# Patient Record
Sex: MALE | Race: WHITE | NOT HISPANIC OR LATINO | Employment: FULL TIME | URBAN - METROPOLITAN AREA
[De-identification: names, ages, dates, MRNs, and addresses within clinical notes are randomized per-mention and may not be internally consistent; named-entity substitution may affect disease eponyms.]

---

## 2017-03-01 ENCOUNTER — OFFICE VISIT (OUTPATIENT)
Dept: URGENT CARE | Facility: CLINIC | Age: 32
End: 2017-03-01
Payer: COMMERCIAL

## 2017-03-01 VITALS
OXYGEN SATURATION: 98 % | DIASTOLIC BLOOD PRESSURE: 86 MMHG | SYSTOLIC BLOOD PRESSURE: 140 MMHG | RESPIRATION RATE: 16 BRPM | TEMPERATURE: 98.4 F | HEART RATE: 76 BPM

## 2017-03-01 DIAGNOSIS — J03.90 TONSILLITIS: ICD-10-CM

## 2017-03-01 LAB
INT CON NEG: NEGATIVE
INT CON POS: POSITIVE
S PYO AG THROAT QL: NEGATIVE

## 2017-03-01 PROCEDURE — 99214 OFFICE O/P EST MOD 30 MIN: CPT | Performed by: PHYSICIAN ASSISTANT

## 2017-03-01 PROCEDURE — 87880 STREP A ASSAY W/OPTIC: CPT | Performed by: PHYSICIAN ASSISTANT

## 2017-03-01 RX ORDER — CEFUROXIME AXETIL 500 MG/1
500 TABLET ORAL 2 TIMES DAILY
Qty: 14 TAB | Refills: 0 | Status: SHIPPED | OUTPATIENT
Start: 2017-03-01 | End: 2017-03-08

## 2017-03-01 RX ORDER — MELOXICAM 15 MG/1
15 TABLET ORAL DAILY
Qty: 10 TAB | Refills: 0 | Status: SHIPPED | OUTPATIENT
Start: 2017-03-01 | End: 2017-04-15

## 2017-03-01 ASSESSMENT — ENCOUNTER SYMPTOMS
RESPIRATORY NEGATIVE: 1
SWOLLEN GLANDS: 1
COUGH: 0
TROUBLE SWALLOWING: 1
CONSTITUTIONAL NEGATIVE: 1
EYES NEGATIVE: 1
SORE THROAT: 1

## 2017-03-01 NOTE — MR AVS SNAPSHOT
Benny Barnard Kentrelldaciacindi DE GUZMAN   3/1/2017 6:10 PM   Office Visit   MRN: [id number]    Department:  [facility]   Dept Phone:  [phone]    Description:  Male : 1985   Provider:  Darci Shanks PA-C           Reason for Visit     Pharyngitis x 2 days, sore throat, hard to talk, pain to swallow      Allergies as of 3/1/2017     No Known Allergies      You were diagnosed with     Tonsillitis   [[id number]]         Vital Signs     Blood Pressure Pulse Temperature Respirations Oxygen Saturation Smoking Status    140/86 mmHg 76 36.9 °C (98.4 °F) 16 98% Never Smoker       Basic Information     Date Of Birth Sex Race Ethnicity Preferred Language    1985 Male White Non- English      Health Maintenance        Date Due Completion Dates    IMM DTaP/Tdap/Td Vaccine (1 - Tdap) 2006 ---    IMM INFLUENZA (1) 2016 ---            Current Immunizations     No immunizations on file.      Below and/or attached are the medications your provider expects you to take. Review all of your home medications and newly ordered medications with your provider and/or pharmacist. Follow medication instructions as directed by your provider and/or pharmacist. Please keep your medication list with you and share with your provider. Update the information when medications are discontinued, doses are changed, or new medications (including over-the-counter products) are added; and carry medication information at all times in the event of emergency situations     Allergies:  No Known Allergies          Medications  Valid as of: 2017 -  8:06 PM    Generic Name Brand Name Tablet Size Instructions for use    Cefuroxime Axetil (Tab) CEFTIN 500 MG Take 1 Tab by mouth 2 times a day for 7 days.        Ibuprofen (Tab) MOTRIN 800 MG Take 1 Tab by mouth every 8 hours as needed.        Meclizine HCl (Tab) ANTIVERT 25 MG 0.5 TO 1 TAB EVERY 6 HOURS ONLY IF NEEDED FOR DIZZINESS, NAUSEA, OR VOMITING. MAY CAUSE DROWSINESS.       Meloxicam (Tab) MOBIC 15 MG Take 1 Tab by mouth every day. as needed for pain        .                 Medicines prescribed today were sent to:     [facility] [place], [place] - [address]    [address] [place] [address]    Phone: [phone] Fax: [phone]    Open 24 Hours?: No      Medication refill instructions:       If your prescription bottle indicates you have medication refills left, it is not necessary to call your provider’s office. Please contact your pharmacy and they will refill your medication.    If your prescription bottle indicates you do not have any refills left, you may request refills at any time through one of the following ways: The online Visto system (except Urgent Care), by calling your provider’s office, or by asking your pharmacy to contact your provider’s office with a refill request. Medication refills are processed only during regular business hours and may not be available until the next business day. Your provider may request additional information or to have a follow-up visit with you prior to refilling your medication.   *Please Note: Medication refills are assigned a new Rx number when refilled electronically. Your pharmacy may indicate that no refills were authorized even though a new prescription for the same medication is available at the pharmacy. Please request the medicine by name with the pharmacy before contacting your provider for a refill.           Visto Access Code: [email or web address]  Expires: 3/31/2017  8:06 PM    Visto  A secure, online tool to manage your health information     [employer] Visto® is a secure, online tool that connects you to your personalized health information from the privacy of your home -- day or night - making it very easy for you to manage your healthcare. Once the activation process is completed, you can even access your medical information using the Visto юлия, which is available for free in the Apple Юлия store  or Google Play store.     [employer] provides the following levels of access (as shown below):   My Chart Features   [facility] Doctor [facility]  Specialists [facility]  Urgent  Care Non-[facility]  Primary Care  Doctor   Email your healthcare team securely and privately 24/7 X X X    Manage appointments: schedule your next appointment; view details of past/upcoming appointments X      Request prescription refills. X      View recent personal medical records, including lab and immunizations X X X X   View health record, including health history, allergies, medications X X X X   Read reports about your outpatient visits, procedures, consult and ER notes X X X X   See your discharge summary, which is a recap of your hospital and/or ER visit that includes your diagnosis, lab results, and care plan. X X       How to register for [employer]:  1. Go to  [email or web address].  2. Click on the Sign Up Now box, which takes you to the New Member Sign Up page. You will need to provide the following information:  a. Enter your [employer] Access Code exactly as it appears at the top of this page. (You will not need to use this code after you’ve completed the sign-up process. If you do not sign up before the expiration date, you must request a new code.)   b. Enter your date of birth.   c. Enter your home email address.   d. Click Submit, and follow the next screen’s instructions.  3. Create a [employer] ID. This will be your [employer] login ID and cannot be changed, so think of one that is secure and easy to remember.  4. Create a [employer] password. You can change your password at any time.  5. Enter your Password Reset Question and Answer. This can be used at a later time if you forget your password.   6. Enter your e-mail address. This allows you to receive e-mail notifications when new information is available in [employer].  7. Click Sign Up. You can now view your health information.    For assistance activating your [employer] account, call  [phone]

## 2017-03-01 NOTE — Clinical Note
March 1, 2017         Patient: Benny Rodriguez II   YOB: 1985   Date of Visit: 3/1/2017           To Whom it May Concern:    Benny Rodriguez was seen in my clinic on 3/1/2017 for illness; please excuse Thursday.    If you have any questions or concerns, please don't hesitate to call.        Sincerely,           Darci Shanks PA-C  Electronically Signed

## 2017-04-15 PROCEDURE — 99284 EMERGENCY DEPT VISIT MOD MDM: CPT

## 2017-04-15 PROCEDURE — 303977 HCHG I & D

## 2017-04-15 ASSESSMENT — PAIN SCALES - GENERAL: PAINLEVEL_OUTOF10: 5

## 2017-04-16 ENCOUNTER — HOSPITAL ENCOUNTER (EMERGENCY)
Facility: MEDICAL CENTER | Age: 32
End: 2017-04-16
Attending: EMERGENCY MEDICINE
Payer: COMMERCIAL

## 2017-04-16 VITALS
SYSTOLIC BLOOD PRESSURE: 132 MMHG | TEMPERATURE: 98.1 F | OXYGEN SATURATION: 99 % | HEIGHT: 72 IN | BODY MASS INDEX: 27.74 KG/M2 | WEIGHT: 204.81 LBS | HEART RATE: 70 BPM | DIASTOLIC BLOOD PRESSURE: 78 MMHG | RESPIRATION RATE: 16 BRPM

## 2017-04-16 VITALS
RESPIRATION RATE: 15 BRPM | HEART RATE: 67 BPM | WEIGHT: 202.6 LBS | SYSTOLIC BLOOD PRESSURE: 119 MMHG | DIASTOLIC BLOOD PRESSURE: 85 MMHG | TEMPERATURE: 98 F | OXYGEN SATURATION: 97 % | BODY MASS INDEX: 27.47 KG/M2

## 2017-04-16 DIAGNOSIS — L03.90 CELLULITIS, UNSPECIFIED CELLULITIS SITE: ICD-10-CM

## 2017-04-16 DIAGNOSIS — L03.116 CELLULITIS OF LEFT LOWER EXTREMITY: ICD-10-CM

## 2017-04-16 DIAGNOSIS — T78.40XA ALLERGIC REACTION, INITIAL ENCOUNTER: ICD-10-CM

## 2017-04-16 DIAGNOSIS — L50.9 URTICARIA: ICD-10-CM

## 2017-04-16 PROCEDURE — 96372 THER/PROPH/DIAG INJ SC/IM: CPT

## 2017-04-16 PROCEDURE — 10060 I&D ABSCESS SIMPLE/SINGLE: CPT

## 2017-04-16 PROCEDURE — 96375 TX/PRO/DX INJ NEW DRUG ADDON: CPT

## 2017-04-16 PROCEDURE — 700111 HCHG RX REV CODE 636 W/ 250 OVERRIDE (IP): Performed by: EMERGENCY MEDICINE

## 2017-04-16 PROCEDURE — 700111 HCHG RX REV CODE 636 W/ 250 OVERRIDE (IP)

## 2017-04-16 PROCEDURE — 99284 EMERGENCY DEPT VISIT MOD MDM: CPT

## 2017-04-16 PROCEDURE — 700102 HCHG RX REV CODE 250 W/ 637 OVERRIDE(OP): Performed by: EMERGENCY MEDICINE

## 2017-04-16 PROCEDURE — A9270 NON-COVERED ITEM OR SERVICE: HCPCS | Performed by: EMERGENCY MEDICINE

## 2017-04-16 PROCEDURE — 700101 HCHG RX REV CODE 250: Performed by: EMERGENCY MEDICINE

## 2017-04-16 PROCEDURE — 96374 THER/PROPH/DIAG INJ IV PUSH: CPT

## 2017-04-16 RX ORDER — CEPHALEXIN 500 MG/1
500 CAPSULE ORAL 3 TIMES DAILY
Qty: 21 CAP | Refills: 0 | Status: SHIPPED | OUTPATIENT
Start: 2017-04-16 | End: 2017-04-23

## 2017-04-16 RX ORDER — DIPHENHYDRAMINE HCL 25 MG
25 TABLET ORAL 3 TIMES DAILY
Qty: 15 TAB | Refills: 2 | Status: SHIPPED | OUTPATIENT
Start: 2017-04-16 | End: 2017-05-18

## 2017-04-16 RX ORDER — EPINEPHRINE 1 MG/ML(1)
0.3 AMPUL (ML) INJECTION ONCE
Status: COMPLETED | OUTPATIENT
Start: 2017-04-16 | End: 2017-04-16

## 2017-04-16 RX ORDER — LIDOCAINE HYDROCHLORIDE AND EPINEPHRINE 10; 10 MG/ML; UG/ML
10 INJECTION, SOLUTION INFILTRATION; PERINEURAL ONCE
Status: COMPLETED | OUTPATIENT
Start: 2017-04-16 | End: 2017-04-16

## 2017-04-16 RX ORDER — DIPHENHYDRAMINE HYDROCHLORIDE 50 MG/ML
50 INJECTION INTRAMUSCULAR; INTRAVENOUS ONCE
Status: COMPLETED | OUTPATIENT
Start: 2017-04-16 | End: 2017-04-16

## 2017-04-16 RX ORDER — METHYLPREDNISOLONE SODIUM SUCCINATE 125 MG/2ML
INJECTION, POWDER, LYOPHILIZED, FOR SOLUTION INTRAMUSCULAR; INTRAVENOUS
Status: COMPLETED
Start: 2017-04-16 | End: 2017-04-16

## 2017-04-16 RX ORDER — CLINDAMYCIN HYDROCHLORIDE 300 MG/1
300 CAPSULE ORAL 4 TIMES DAILY
Qty: 20 CAP | Refills: 0 | Status: SHIPPED | OUTPATIENT
Start: 2017-04-16 | End: 2017-04-21

## 2017-04-16 RX ORDER — PREDNISONE 20 MG/1
60 TABLET ORAL ONCE
Status: COMPLETED | OUTPATIENT
Start: 2017-04-16 | End: 2017-04-16

## 2017-04-16 RX ORDER — METHYLPREDNISOLONE SODIUM SUCCINATE 125 MG/2ML
125 INJECTION, POWDER, LYOPHILIZED, FOR SOLUTION INTRAMUSCULAR; INTRAVENOUS ONCE
Status: COMPLETED | OUTPATIENT
Start: 2017-04-16 | End: 2017-04-16

## 2017-04-16 RX ORDER — SULFAMETHOXAZOLE AND TRIMETHOPRIM 800; 160 MG/1; MG/1
1 TABLET ORAL ONCE
Status: COMPLETED | OUTPATIENT
Start: 2017-04-16 | End: 2017-04-16

## 2017-04-16 RX ORDER — CEPHALEXIN 500 MG/1
500 CAPSULE ORAL ONCE
Status: COMPLETED | OUTPATIENT
Start: 2017-04-16 | End: 2017-04-16

## 2017-04-16 RX ORDER — EPINEPHRINE 1 MG/ML(1)
AMPUL (ML) INJECTION
Status: COMPLETED
Start: 2017-04-16 | End: 2017-04-16

## 2017-04-16 RX ORDER — SULFAMETHOXAZOLE AND TRIMETHOPRIM 800; 160 MG/1; MG/1
1 TABLET ORAL 2 TIMES DAILY
Qty: 14 TAB | Refills: 0 | Status: SHIPPED | OUTPATIENT
Start: 2017-04-16 | End: 2017-04-23

## 2017-04-16 RX ADMIN — PREDNISONE 60 MG: 20 TABLET ORAL at 04:28

## 2017-04-16 RX ADMIN — CEPHALEXIN 500 MG: 500 CAPSULE ORAL at 02:49

## 2017-04-16 RX ADMIN — METHYLPREDNISOLONE SODIUM SUCCINATE 125 MG: 125 INJECTION, POWDER, LYOPHILIZED, FOR SOLUTION INTRAMUSCULAR; INTRAVENOUS at 04:30

## 2017-04-16 RX ADMIN — DIPHENHYDRAMINE HYDROCHLORIDE 50 MG: 50 INJECTION, SOLUTION INTRAMUSCULAR; INTRAVENOUS at 04:29

## 2017-04-16 RX ADMIN — LIDOCAINE HYDROCHLORIDE,EPINEPHRINE BITARTRATE 10 ML: 10; .01 INJECTION, SOLUTION INFILTRATION; PERINEURAL at 02:45

## 2017-04-16 RX ADMIN — Medication 0.3 MG: at 04:30

## 2017-04-16 RX ADMIN — METHYLPREDNISOLONE SODIUM SUCCINATE 125 MG: 125 INJECTION, POWDER, FOR SOLUTION INTRAMUSCULAR; INTRAVENOUS at 04:30

## 2017-04-16 RX ADMIN — SULFAMETHOXAZOLE AND TRIMETHOPRIM 1 TABLET: 800; 160 TABLET ORAL at 02:49

## 2017-04-16 RX ADMIN — FAMOTIDINE 20 MG: 10 INJECTION, SOLUTION INTRAVENOUS at 04:30

## 2017-04-16 RX ADMIN — EPINEPHRINE 0.3 MG: 1 INJECTION, SOLUTION INTRAMUSCULAR; SUBCUTANEOUS at 04:30

## 2017-04-16 NOTE — ED PROVIDER NOTES
ED Provider Note    CHIEF COMPLAINT  Chief Complaint   Patient presents with   • Allergic Reaction     since aprox 0330. +hives, +facial swelling, +throat swelling       HPI  Benny Rodriguez II is a 31 y.o. male who presents with history of coming to the emergency room last night. Treated for a cellulitis on his knee with Septra, and Keflex. Shortly after he took the antibiotics, about 30 minutes he noted a rash on his face and trunk, slightly on the extremities, also itching. No fever no chills no nausea no vomiting no similar episodes. No shortness of breath or difficulty breathing    REVIEW OF SYSTEMS  See HPI for further details.Denies other G.I., G.U.. endrocine, cardiovascular, respriatory or neurological problems.  All other systems are negative.     PAST MEDICAL HISTORY  History reviewed. No pertinent past medical history.    FAMILY HISTORY  History reviewed. No pertinent family history.    SOCIAL HISTORY  Social History     Social History   • Marital Status:      Spouse Name: N/A   • Number of Children: N/A   • Years of Education: N/A     Social History Main Topics   • Smoking status: Never Smoker    • Smokeless tobacco: None   • Alcohol Use: No   • Drug Use: No   • Sexual Activity: Not Asked     Other Topics Concern   • None     Social History Narrative       SURGICAL HISTORY  Past Surgical History   Procedure Laterality Date   • Appendectomy         CURRENT MEDICATIONS  Home Medications     Reviewed by Mica Meng R.N. (Registered Nurse) on 04/16/17 at 0409  Med List Status: Complete    Medication Last Dose Status    cephALEXin (KEFLEX) 500 MG Cap  Active    ibuprofen (MOTRIN) 800 MG Tab 4/15/2017 Active    sulfamethoxazole-trimethoprim (BACTRIM DS) 800-160 MG tablet  Active                ALLERGIES  No Known Allergies    PHYSICAL EXAM  VITAL SIGNS: /85 mmHg  Pulse 86  Temp(Src) 36.7 °C (98 °F)  Resp 20  Wt 91.9 kg (202 lb 9.6 oz)  SpO2 99%  Constitutional: Well developed,  Well nourished, No acute distress, Non-toxic appearance.   HENT: Normocephalic, Atraumatic, Bilateral external ears normal, Oropharynx moist, No oral exudates, Nose normal.   Eyes: PERRL, EOMI, Conjunctiva normal, No discharge.   Neck: Normal range of motion, No tenderness, Supple, No stridor.   Lymphatic: No lymphadenopathy noted.   Cardiovascular: Normal heart rate, Normal rhythm, No murmurs, No rubs, No gallops.   Thorax & Lungs: Normal breath sounds, No respiratory distress, No wheezing, No chest tenderness.   Abdomen:  No tenderness, no guarding no rigidity and the abdomen is soft.  No masses, No pulsatile masses.  Skin: Warm, Dry, there is an urticarial rash, trunk and face proximal extremities  Back: No tenderness, No CVA tenderness.   Extremities: Intact distal pulses, redness dorsum of left knee, good range of motion of left knee, No cyanosis, No clubbing.   Musculoskeletal: Good range of motion in all major joints. No tenderness to palpation or major deformities noted.   Neurologic: Alert & oriented x 3, Normal motor function, Normal sensory function, No focal deficits noted.   Psychiatric: Affect normal, Judgment normal, Mood normal.       RADIOLOGY/PROCEDURES      COURSE & MEDICAL DECISION MAKING  Pertinent Labs & Imaging studies reviewed. (See chart for details) and appears to have allergic reaction, is given an IM epinephrine, Pepcid and Benadryl and prednisone. He was    He was here earlier in the evening for a cellulitis to his left knee, treated with Bactrim, Keflex, and immediately developed a rash after the antibiotics. I think he probably most likely has allergic reaction, urticaria treated as above    Review is responded well to epinephrine and a drill Pepcid and prednisone. Rashes completely disappeared no more itching. It does appear however he probably has an allergy to either Septra or Keflex,. So these medications will be discontinued. I will place him on Cleocin for his infection.  FINAL  IMPRESSION  1.   1. Urticaria    2. Allergic reaction, initial encounter        2. Cellulitis  3.     Disposition  Discharge instructions are understood. This patient is to return if fever vomiting or no better in 12 hours. Follow up with the [facility] clinic or private physician. Information sheets on allergic reaction urticaria and cellulitis  Electronically signed by: Huber Sorto, 4/16/2017 4:36 AM

## 2017-04-16 NOTE — ED AVS SNAPSHOT
4/16/2017    Benny Rodriguez   [address]  [place] [address]    Dear Benny:    [facility] wants to ensure your discharge home is safe and you or your loved ones have had all of your questions answered regarding your care after you leave the hospital.    Below is a list of resources and contact information should you have any questions regarding your hospital stay, follow-up instructions, or active medical symptoms.    Questions or Concerns Regarding… Contact   Medical Questions Related to Your Discharge  (7 days a week, 8am-5pm) Contact a Nurse Care Coordinator   [phone]   Medical Questions Not Related to Your Discharge  (24 hours a day / 7 days a week)  Contact the Nurse Health Line   [phone]    Medications or Discharge Instructions Refer to your discharge packet   or contact your [facility] Primary Care Provider   [phone]   Follow-up Appointment(s) Schedule your appointment via [employer]   or contact Scheduling [phone]   Billing Review your statement via [employer]  or contact Billing [phone]   Medical Records Review your records via [employer]   or contact Medical Records [phone]     You may receive a telephone call within two days of discharge. This call is to make certain you understand your discharge instructions and have the opportunity to have any questions answered. You can also easily access your medical information, test results and upcoming appointments via the [employer] free online health management tool. You can learn more and sign up at [employer]/Sonos. For assistance setting up your Sonos account, please call [phone].    Once again, we want to ensure your discharge home is safe and that you have a clear understanding of any next steps in your care. If you have any questions or concerns, please do not hesitate to contact us, we are here for you. Thank you for choosing [facility] for your healthcare needs.    Sincerely,    Your [facility] Healthcare Team

## 2017-04-16 NOTE — ED AVS SNAPSHOT
[employer] Access Code: [email or web address]  Expires: 5/16/2017  3:26 AM    Your email address is not on file at [facility].  Email Addresses are required for you to sign up for [employer], please contact [phone] to verify your personal information and to provide your email address prior to attempting to register for [employer].    Benny Rodriguez II  [address]  [place] [address]    [employer]  A secure, online tool to manage your health information     [employer]® is a secure, online tool that connects you to your personalized health information from the privacy of your home -- day or night - making it very easy for you to manage your healthcare. Once the activation process is completed, you can even access your medical information using the [employer] юлия, which is available for free in the Apple Юлия store or Google Play store.     To learn more about [employer], visit [email or web address]    There are two levels of access available (as shown below):   My Chart Features  [facility] Primary Care Doctor [facility]  Specialists [facility]  Urgent  Care Non-[facility] Primary Care Doctor   Email your healthcare team securely and privately 24/7 X X X    Manage appointments: schedule your next appointment; view details of past/upcoming appointments X      Request prescription refills. X      View recent personal medical records, including lab and immunizations X X X X   View health record, including health history, allergies, medications X X X X   Read reports about your outpatient visits, procedures, consult and ER notes X X X X   See your discharge summary, which is a recap of your hospital and/or ER visit that includes your diagnosis, lab results, and care plan X X  X     How to register for [employer]:  Once your e-mail address has been verified, follow the following steps to sign up for [employer].     1. Go to  [email or web address]  2. Click on the Sign Up Now box, which takes you to the New Member  Sign Up page. You will need to provide the following information:  a. Enter your [employer] Access Code exactly as it appears at the top of this page. (You will not need to use this code after you’ve completed the sign-up process. If you do not sign up before the expiration date, you must request a new code.)   b. Enter your date of birth.   c. Enter your home email address.   d. Click Submit, and follow the next screen’s instructions.  3. Create a [employer] ID. This will be your [employer] login ID and cannot be changed, so think of one that is secure and easy to remember.  4. Create a Exec password. You can change your password at any time.  5. Enter your Password Reset Question and Answer. This can be used at a later time if you forget your password.   6. Enter your e-mail address. This allows you to receive e-mail notifications when new information is available in [employer].  7. Click Sign Up. You can now view your health information.    For assistance activating your [employer] account, call [phone]

## 2017-04-16 NOTE — ED PROVIDER NOTES
ED Provider Note    Scribed for Aleksandra Guevara M.D. by Estelle Torres. 4/16/2017, 2:35 AM.    Primary care provider: Pcp Not In Computer  Means of arrival: Walk-In  History obtained from: Patient  History limited by: None    CHIEF COMPLAINT  Chief Complaint   Patient presents with   • Abscess     Left knee. Radius has increased since this AM.      HPI  Benny Rodriguez II is a 31 y.o. male who presents to the Emergency Department with an abscess to the left knee.  He can not recall incurring an insect bite or trauma.  The patient first noticed this abscess approximately eighteen hours prior to my examination.  Since then the abscess has slightly increased in size.  The patient notes that this abscess is erythematous and warm to the touch.  He has not developed associated fevers or chills.  The patient denies additional symptoms or further pertinent medical history.     REVIEW OF SYSTEMS  Pertinent positives include abscess with associated warmth. Pertinent negatives include no fevers, chills.     PAST MEDICAL HISTORY   The patient denies pertinent past medical history.     SURGICAL HISTORY   has past surgical history that includes appendectomy.    SOCIAL HISTORY  Social History   Substance Use Topics   • Smoking status: Never Smoker    • Smokeless tobacco: None   • Alcohol Use: No      History   Drug Use No     FAMILY HISTORY  History reviewed. No pertinent family history.    CURRENT MEDICATIONS  Home Medications     Reviewed by Mica Meng R.N. (Registered Nurse) on 04/15/17 at 2146  Med List Status: Complete    Medication Last Dose Status    ibuprofen (MOTRIN) 800 MG Tab 4/15/2017 Active              ALLERGIES  No Known Allergies    PHYSICAL EXAM  VITAL SIGNS: /78 mmHg  Pulse 71  Temp(Src) 36.8 °C (98.2 °F) (Temporal)  Resp 16  Ht 1.829 m (6')  Wt 92.9 kg (204 lb 12.9 oz)  BMI 27.77 kg/m2  SpO2 99%  Constitutional: Alert in no apparent distress.  HENT: No signs of trauma, Bilateral  external ears normal, Nose normal.   Cardiovascular: Regular rate and rhythm, no murmurs.   Thorax & Lungs: non labored respirations  Skin: Warm, Dry, No erythema, No rash. Left knee with a less than 0.5 cm lesion with dark discoloration surrounded by erythema extending about 7-8 cm. No active drainage   Musculoskeletal:  No major deformities noted.   Neurologic: Alert, moving all extremities without difficulty, no focal deficits.    Incision and Drainage Procedure Note    Indication: Abscess    Procedure: The patient was positioned appropriately and the skin over the incision site was prepped with betadine. Local anesthesia was obtained by infiltration using 1% Lidocaine with epinephrine.  An incision was then made over the apex of the lesion and no material was expressed. Loculations were not present.    The patient tolerated the procedure well.    Complications: None    COURSE & MEDICAL DECISION MAKING  Pertinent Labs & Imaging studies reviewed. (See chart for details)    2:35 AM - Patient seen and examined at bedside. Patient presents with an abscess to the left knee. He was informed that I will perform an incision and drainage procedure. Patient understood and agreed. He will be treated with 500 mg of Keflex via tablet and 800 mg of Bactrim via tablet.     2:47 PM - Re-examined; At this time I performed the incision and drainage procedure. The patient tolerated this well and further details can be seen above. Following the procedure I discussed his above findings and plans for discharge with prescriptions for Bactrim and Keflex. The patient will follow up with his primary care physician as needed. He was instructed to return to the ED if his symptoms worsen. The patient will receive further information on abscesses to take home.  All questions and concerns were addressed.  Patient understands and agrees.       The patient will return for new or worsening symptoms and is stable at the time of discharge. Patient  was given return precautions. Patient and/or family member verbalizes understanding and will comply.    DISPOSITION:  Patient will be discharged home in stable condition.    FOLLOW UP:  [facility], Emergency Dept  [address]  [place] [address] [phone]    Return for increasing redness, swelling, fever or other concerns      OUTPATIENT MEDICATIONS:  New Prescriptions    CEPHALEXIN (KEFLEX) 500 MG CAP    Take 1 Cap by mouth 3 times a day for 7 days.    SULFAMETHOXAZOLE-TRIMETHOPRIM (BACTRIM DS) 800-160 MG TABLET    Take 1 Tab by mouth 2 times a day for 7 days.     FINAL IMPRESSION  1. Cellulitis of left lower extremity       Incision and Drainage Procedure Completed    Your blood pressure is elevated here in the emergency department. Please monitor your blood pressure over the next several days. If your blood pressure continues to be 120/80 or higher please contact your physician for blood pressure management.     This dictation has been created using voice recognition software and/or scribes. The accuracy of the dictation is limited by the abilities of the software and the expertise of the scribes. I expect there may be some errors of grammar and possibly content. I made every attempt to manually correct the errors within my dictation. However, errors related to voice recognition software and/or scribes may still exist and should be interpreted within the appropriate context.     I, Estelle Torres (Scribe), am scribing for, and in the presence of, Aleksandra Guevara M.D..    Electronically signed by: Estelle Torres (Scribe), 4/16/2017    Aleksandra PATEL M.D. personally performed the services described in this documentation, as scribed by Estelle Torres in my presence, and it is both accurate and complete.    The note accurately reflects work and decisions made by me.  Aleksandra Guevara  4/16/2017  4:53 AM

## 2017-04-16 NOTE — ED NOTES
Patient to follow up with PCP or [facility], patient verbalizes understanding of discharge instructions and home medications. Patient ambulatory upon discharge.

## 2017-04-16 NOTE — ED AVS SNAPSHOT
Home Care Instructions                                                                                                                Benny Rodriguez II   MRN: [id number]    Department:  [facility], Emergency Dept   Date of Visit:  4/16/2017            [facility], Emergency Dept    [address]    [place] [address]    Phone:  [phone]      You were seen by     Huber Sorto M.D.      Your Diagnosis Was     Urticaria     L50.9       These are the medications you received during your hospitalization from 04/16/2017 0403 to 04/16/2017 0639     Date/Time Order Dose Route Action    04/16/2017 0430 epinephrine 1 mg/mL(1:1000) injection 0.3 mg 0.3 mg Intramuscular Given    04/16/2017 0430 methylPREDNISolone sod succ (SOLU-MEDROL) 125 MG injection 125 mg 125 mg Intravenous Given    04/16/2017 0428 predniSONE (DELTASONE) tablet 60 mg 60 mg Oral Given    04/16/2017 0429 diphenhydrAMINE (BENADRYL) injection 50 mg 50 mg Intravenous Given    04/16/2017 0430 famotidine (PEPCID) injection 20 mg 20 mg Intravenous Given      Follow-up Information     1. Follow up with [facility]. Schedule an appointment as soon as possible for a visit today.    Contact information    [facility]  [place] [address]  [phone]        Medication Information     Review all of your home medications and newly ordered medications with your primary doctor and/or pharmacist as soon as possible. Follow medication instructions as directed by your doctor and/or pharmacist.     Please keep your complete medication list with you and share with your physician. Update the information when medications are discontinued, doses are changed, or new medications (including over-the-counter products) are added; and carry medication information at all times in the event of emergency situations.               Medication List      START taking these medications        Instructions    Morning Afternoon Evening  Bedtime    clindamycin 300 MG Caps   Commonly known as:  CLEOCIN        Take 1 Cap by mouth 4 times a day for 5 days.   Dose:  300 mg                        diphenhydrAMINE 25 MG Tabs   Commonly known as:  BENADRYL        Take 1 tablet by mouth 3 times a day.   Dose:  25 mg                          ASK your doctor about these medications        Instructions    Morning Afternoon Evening Bedtime    cephALEXin 500 MG Caps   Commonly known as:  KEFLEX        Take 1 Cap by mouth 3 times a day for 7 days.   Dose:  500 mg                        ibuprofen 800 MG Tabs   Commonly known as:  MOTRIN        Take 1 Tab by mouth every 8 hours as needed.   Dose:  800 mg                        sulfamethoxazole-trimethoprim 800-160 MG tablet   Commonly known as:  BACTRIM DS        Take 1 Tab by mouth 2 times a day for 7 days.   Dose:  1 Tab                             Where to Get Your Medications      These medications were sent to [facility] - [place] - [address]  [address] [address]     Phone:  [phone]    - clindamycin 300 MG Caps  - diphenhydrAMINE 25 MG Tabs              Discharge Instructions       Cellulitis  Cellulitis is an infection of the skin and the tissue beneath it. The infected area is usually red and tender. Cellulitis occurs most often in the arms and lower legs.   CAUSES   Cellulitis is caused by bacteria that enter the skin through cracks or cuts in the skin. The most common types of bacteria that cause cellulitis are staphylococci and streptococci.  SIGNS AND SYMPTOMS   · Redness and warmth.  · Swelling.  · Tenderness or pain.  · Fever.  DIAGNOSIS   Your health care provider can usually determine what is wrong based on a physical exam. Blood tests may also be done.  TREATMENT   Treatment usually involves taking an antibiotic medicine.  HOME CARE INSTRUCTIONS   · Take your antibiotic medicine as directed by your health care provider. Finish the antibiotic even if you start to feel  better.  · Keep the infected arm or leg elevated to reduce swelling.  · Apply a warm cloth to the affected area up to 4 times per day to relieve pain.  · Take medicines only as directed by your health care provider.  · Keep all follow-up visits as directed by your health care provider.  SEEK MEDICAL CARE IF:   · You notice red streaks coming from the infected area.  · Your red area gets larger or turns dark in color.  · Your bone or joint underneath the infected area becomes painful after the skin has healed.  · Your infection returns in the same area or another area.  · You notice a swollen bump in the infected area.  · You develop new symptoms.  · You have a fever.  SEEK IMMEDIATE MEDICAL CARE IF:   · You feel very sleepy.  · You develop vomiting or diarrhea.  · You have a general ill feeling (malaise) with muscle aches and pains.  MAKE SURE YOU:   · Understand these instructions.  · Will watch your condition.  · Will get help right away if you are not doing well or get worse.     This information is not intended to replace advice given to you by your health care provider. Make sure you discuss any questions you have with your health care provider.     Document Released: 09/27/2006 Document Revised: 01/08/2016 Document Reviewed: 03/04/2013  [employer] Interactive Patient Education ©2016 [employer]  Hives  Hives are itchy, red, swollen areas of the skin. They can vary in size and location on your body. Hives can come and go for hours or several days (acute hives) or for several weeks (chronic hives). Hives do not spread from person to person (noncontagious). They may get worse with scratching, exercise, and emotional stress.  CAUSES   · Allergic reaction to food, additives, or drugs.  · Infections, including the common cold.  · Illness, such as vasculitis, lupus, or thyroid disease.  · Exposure to sunlight, heat, or cold.  · Exercise.  · Stress.  · Contact with chemicals.  SYMPTOMS   · Red or white swollen patches  on the skin. The patches may change size, shape, and location quickly and repeatedly.  · Itching.  · Swelling of the hands, feet, and face. This may occur if hives develop deeper in the skin.  DIAGNOSIS   Your caregiver can usually tell what is wrong by performing a physical exam. Skin or blood tests may also be done to determine the cause of your hives. In some cases, the cause cannot be determined.  TREATMENT   Mild cases usually get better with medicines such as antihistamines. Severe cases may require an emergency epinephrine injection. If the cause of your hives is known, treatment includes avoiding that trigger.   HOME CARE INSTRUCTIONS   · Avoid causes that trigger your hives.  · Take antihistamines as directed by your caregiver to reduce the severity of your hives. Non-sedating or low-sedating antihistamines are usually recommended. Do not drive while taking an antihistamine.  · Take any other medicines prescribed for itching as directed by your caregiver.  · Wear loose-fitting clothing.  · Keep all follow-up appointments as directed by your caregiver.  SEEK MEDICAL CARE IF:   · You have persistent or severe itching that is not relieved with medicine.  · You have painful or swollen joints.  SEEK IMMEDIATE MEDICAL CARE IF:   · You have a fever.  · Your tongue or lips are swollen.  · You have trouble breathing or swallowing.  · You feel tightness in the throat or chest.  · You have abdominal pain.  These problems may be the first sign of a life-threatening allergic reaction. Call your local emergency services (911 in [place]  MAKE SURE YOU:   · Understand these instructions.  · Will watch your condition.  · Will get help right away if you are not doing well or get worse.     This information is not intended to replace advice given to you by your health care provider. Make sure you discuss any questions you have with your health care provider.     Document Released: 12/18/2006 Document Revised: 12/23/2014  Document Reviewed: 03/12/2013  Ivaldi Interactive Patient Education ©2016 [employer]            Patient Information     Patient Information    Following emergency treatment: all patient requiring follow-up care must return either to a private physician or a clinic if your condition worsens before you are able to obtain further medical attention, please return to the emergency room.     Billing Information    At [facility], we work to make the billing process streamlined for our patients.  Our Representatives are here to answer any questions you may have regarding your hospital bill.  If you have insurance coverage and have supplied your insurance information to us, we will submit a claim to your insurer on your behalf.  Should you have any questions regarding your bill, we can be reached online or by phone as follows:  Online: You are able pay your bills online or live chat with our representatives about any billing questions you may have. We are here to help Monday - Friday from 8:00am to 7:30pm and 9:00am - 12:00pm on Saturdays.  Please visit [email or web address]  for more information.   Phone:  [phone] or [phone]    Please note that your emergency physician, surgeon, pathologist, radiologist, anesthesiologist, and other specialists are not employed by [facility] and will therefore bill separately for their services.  Please contact them directly for any questions concerning their bills at the numbers below:     Emergency Physician Services:  [phone]  [facility]:  [phone]  [facility]:  [phone]  [facility]:  [phone]    1. Your final bill may vary from the amount quoted upon discharge if all procedures are not complete at that time, or if your doctor has additional procedures of which we are not aware. You will receive an additional bill if you return to the Emergency Department at [facility]  for suture removal regardless of the facility of which the sutures were placed.     2. Please arrange for settlement of this account at the emergency registration.    3. All self-pay accounts are due in full at the time of treatment.  If you are unable to meet this obligation then payment is expected within 4-5 days.     4. If you have had radiology studies (CT, X-ray, Ultrasound, MRI), you have received a preliminary result during your emergency department visit. Please contact the radiology department [phone] to receive a copy of your final result. Please discuss the Final result with your primary physician or with the follow up physician provided.     Crisis Hotline:  [place] Crisis Hotline:  6-271-SZVXJYO or 1-686.768.7925  [place] Crisis Hotline:    1-176.664.7988 or 697-725-0965         ED Discharge Follow Up Questions    1. In order to provide you with very good care, we would like to follow up with a phone call in the next few days.  May we have your permission to contact you?     YES /  NO    2. What is the best phone number to call you? (       )_____-__________    3. What is the best time to call you?      Morning  /  Afternoon  /  Evening                   Patient Signature:  ____________________________________________________________    Date:  ____________________________________________________________

## 2017-04-16 NOTE — DISCHARGE INSTRUCTIONS
Please follow up with a primary physician for blood pressure management, diabetic screening, and all other preventive health concerns.     Cellulitis  Cellulitis is an infection of the skin and the tissue beneath it. The infected area is usually red and tender. Cellulitis occurs most often in the arms and lower legs.   CAUSES   Cellulitis is caused by bacteria that enter the skin through cracks or cuts in the skin. The most common types of bacteria that cause cellulitis are staphylococci and streptococci.  SIGNS AND SYMPTOMS   · Redness and warmth.  · Swelling.  · Tenderness or pain.  · Fever.  DIAGNOSIS   Your health care provider can usually determine what is wrong based on a physical exam. Blood tests may also be done.  TREATMENT   Treatment usually involves taking an antibiotic medicine.  HOME CARE INSTRUCTIONS   · Take your antibiotic medicine as directed by your health care provider. Finish the antibiotic even if you start to feel better.  · Keep the infected arm or leg elevated to reduce swelling.  · Apply a warm cloth to the affected area up to 4 times per day to relieve pain.  · Take medicines only as directed by your health care provider.  · Keep all follow-up visits as directed by your health care provider.  SEEK MEDICAL CARE IF:   · You notice red streaks coming from the infected area.  · Your red area gets larger or turns dark in color.  · Your bone or joint underneath the infected area becomes painful after the skin has healed.  · Your infection returns in the same area or another area.  · You notice a swollen bump in the infected area.  · You develop new symptoms.  · You have a fever.  SEEK IMMEDIATE MEDICAL CARE IF:   · You feel very sleepy.  · You develop vomiting or diarrhea.  · You have a general ill feeling (malaise) with muscle aches and pains.  MAKE SURE YOU:   · Understand these instructions.  · Will watch your condition.  · Will get help right away if you are not doing well or get worse.      This information is not intended to replace advice given to you by your health care provider. Make sure you discuss any questions you have with your health care provider.     Document Released: 09/27/2006 Document Revised: 01/08/2016 Document Reviewed: 03/04/2013  Elsevier Interactive Patient Education ©2016 Elsevier Inc.

## 2017-04-16 NOTE — ED NOTES
Benny Rodriguez II  31 y.o. male  Chief Complaint   Patient presents with   • Allergic Reaction     since aprox 0330     Pt d/c'd earlier today. Pt medicated with Bactrim and Keflex prior to being d/c'd.    Pt placed in lobby. Pt educated on triage process. Pt encouraged to alert staff for any changes.

## 2017-04-16 NOTE — ED AVS SNAPSHOT
4/16/2017    Benny Rodriguez   [address]  [place] [address]    Dear Benny:    [facility] wants to ensure your discharge home is safe and you or your loved ones have had all of your questions answered regarding your care after you leave the hospital.    Below is a list of resources and contact information should you have any questions regarding your hospital stay, follow-up instructions, or active medical symptoms.    Questions or Concerns Regarding… Contact   Medical Questions Related to Your Discharge  (7 days a week, 8am-5pm) Contact a Nurse Care Coordinator   [phone]   Medical Questions Not Related to Your Discharge  (24 hours a day / 7 days a week)  Contact the Nurse Health Line   [phone]    Medications or Discharge Instructions Refer to your discharge packet   or contact your [facility] Primary Care Provider   [phone]   Follow-up Appointment(s) Schedule your appointment via [employer]   or contact Scheduling [phone]   Billing Review your statement via [employer]  or contact Billing [phone]   Medical Records Review your records via [employer]   or contact Medical Records [phone]     You may receive a telephone call within two days of discharge. This call is to make certain you understand your discharge instructions and have the opportunity to have any questions answered. You can also easily access your medical information, test results and upcoming appointments via the [employer] free online health management tool. You can learn more and sign up at [email or web address]. For assistance setting up your [employer] account, please call [phone].    Once again, we want to ensure your discharge home is safe and that you have a clear understanding of any next steps in your care. If you have any questions or concerns, please do not hesitate to contact us, we are here for you. Thank you for choosing [facility] for your healthcare needs.    Sincerely,    Your [facility] Healthcare Team

## 2017-04-16 NOTE — ED AVS SNAPSHOT
frestyl Access Code: [email or web address]  Expires: 5/16/2017  3:26 AM    Your email address is not on file at [facility].  Email Addresses are required for you to sign up for frestyl, please contact [phone] to verify your personal information and to provide your email address prior to attempting to register for frestyl.    Benny Rodriguez II  [address]  [place] [address]    frestyl  A secure, online tool to manage your health information     [facility]’s frestyl® is a secure, online tool that connects you to your personalized health information from the privacy of your home -- day or night - making it very easy for you to manage your healthcare. Once the activation process is completed, you can even access your medical information using the frestyl юлия, which is available for free in the Apple Юлия store or Google Play store.     To learn more about frestyl, visit [email or web address]    There are two levels of access available (as shown below):   My Chart Features  [facility] Primary Care Doctor [facility]  Specialists [facility]  Urgent  Care [facility] Primary Care Doctor   Email your healthcare team securely and privately 24/7 X X X    Manage appointments: schedule your next appointment; view details of past/upcoming appointments X      Request prescription refills. X      View recent personal medical records, including lab and immunizations X X X X   View health record, including health history, allergies, medications X X X X   Read reports about your outpatient visits, procedures, consult and ER notes X X X X   See your discharge summary, which is a recap of your hospital and/or ER visit that includes your diagnosis, lab results, and care plan X X  X     How to register for Bio[employer]:  Once your e-mail address has been verified, follow the following steps to sign up for BioScript.     1. Go to  [email or web address]  2. Click on the Sign Up Now box, which takes you to the New Member  Sign Up page. You will need to provide the following information:  a. Enter your bContext Access Code exactly as it appears at the top of this page. (You will not need to use this code after you’ve completed the sign-up process. If you do not sign up before the expiration date, you must request a new code.)   b. Enter your date of birth.   c. Enter your home email address.   d. Click Submit, and follow the next screen’s instructions.  3. Create a bContext ID. This will be your bContext login ID and cannot be changed, so think of one that is secure and easy to remember.  4. Create a bContext password. You can change your password at any time.  5. Enter your Password Reset Question and Answer. This can be used at a later time if you forget your password.   6. Enter your e-mail address. This allows you to receive e-mail notifications when new information is available in bContext.  7. Click Sign Up. You can now view your health information.    For assistance activating your bContext account, call [phone]

## 2017-04-16 NOTE — ED AVS SNAPSHOT
Home Care Instructions                                                                                                                Benny Rodriguez II   MRN: [id number]    Department:  [facility], Emergency Dept   Date of Visit:  4/15/2017            [facility], Emergency Dept    [address] [place] [id number]    Phone:  [phone]      You were seen by     Aleksandra Guevara M.D.      Your Diagnosis Was     Cellulitis of left lower extremity     L03.116       These are the medications you received during your hospitalization from 04/15/2017 2135 to 04/16/2017 0326     Date/Time Order Dose Route Action    04/16/2017 0245 lidocaine-epinephrine 1 %-1:269020 injection 10 mL 10 mL Injection Given    04/16/2017 0249 sulfamethoxazole-trimethoprim (BACTRIM DS) 800-160 MG tablet 1 Tab 1 Tab Oral Given    04/16/2017 0249 cephALEXin (KEFLEX) capsule 500 mg 500 mg Oral Given      Follow-up Information     1. Follow up with [facility], Emergency Dept.    Specialty:  Emergency Medicine    Why:  Return for increasing redness, swelling, fever or other concerns    Contact information    [address] [place] [id number] [phone]      Medication Information     Review all of your home medications and newly ordered medications with your primary doctor and/or pharmacist as soon as possible. Follow medication instructions as directed by your doctor and/or pharmacist.     Please keep your complete medication list with you and share with your physician. Update the information when medications are discontinued, doses are changed, or new medications (including over-the-counter products) are added; and carry medication information at all times in the event of emergency situations.               Medication List      START taking these medications        Instructions    Morning Afternoon Evening Bedtime    cephALEXin 500 MG Caps   Last time this was given:   500 mg on 4/16/2017  2:49 AM   Commonly known as:  KEFLEX        Take 1 Cap by mouth 3 times a day for 7 days.   Dose:  500 mg                        sulfamethoxazole-trimethoprim 800-160 MG tablet   Last time this was given:  1 Tab on 4/16/2017  2:49 AM   Commonly known as:  BACTRIM DS        Take 1 Tab by mouth 2 times a day for 7 days.   Dose:  1 Tab                          ASK your doctor about these medications        Instructions    Morning Afternoon Evening Bedtime    ibuprofen 800 MG Tabs   Commonly known as:  MOTRIN        Take 1 Tab by mouth every 8 hours as needed.   Dose:  800 mg                             Where to Get Your Medications      You can get these medications from any pharmacy     Bring a paper prescription for each of these medications    - cephALEXin 500 MG Caps  - sulfamethoxazole-trimethoprim 800-160 MG tablet              Discharge Instructions       Please follow up with a primary physician for blood pressure management, diabetic screening, and all other preventive health concerns.     Cellulitis  Cellulitis is an infection of the skin and the tissue beneath it. The infected area is usually red and tender. Cellulitis occurs most often in the arms and lower legs.   CAUSES   Cellulitis is caused by bacteria that enter the skin through cracks or cuts in the skin. The most common types of bacteria that cause cellulitis are staphylococci and streptococci.  SIGNS AND SYMPTOMS   · Redness and warmth.  · Swelling.  · Tenderness or pain.  · Fever.  DIAGNOSIS   Your health care provider can usually determine what is wrong based on a physical exam. Blood tests may also be done.  TREATMENT   Treatment usually involves taking an antibiotic medicine.  HOME CARE INSTRUCTIONS   · Take your antibiotic medicine as directed by your health care provider. Finish the antibiotic even if you start to feel better.  · Keep the infected arm or leg elevated to reduce swelling.  · Apply a warm cloth to the  affected area up to 4 times per day to relieve pain.  · Take medicines only as directed by your health care provider.  · Keep all follow-up visits as directed by your health care provider.  SEEK MEDICAL CARE IF:   · You notice red streaks coming from the infected area.  · Your red area gets larger or turns dark in color.  · Your bone or joint underneath the infected area becomes painful after the skin has healed.  · Your infection returns in the same area or another area.  · You notice a swollen bump in the infected area.  · You develop new symptoms.  · You have a fever.  SEEK IMMEDIATE MEDICAL CARE IF:   · You feel very sleepy.  · You develop vomiting or diarrhea.  · You have a general ill feeling (malaise) with muscle aches and pains.  MAKE SURE YOU:   · Understand these instructions.  · Will watch your condition.  · Will get help right away if you are not doing well or get worse.     This information is not intended to replace advice given to you by your health care provider. Make sure you discuss any questions you have with your health care provider.     Document Released: 09/27/2006 Document Revised: 01/08/2016 Document Reviewed: 03/04/2013  [employer] Interactive Patient Education ©2016 [employer]            Patient Information     Patient Information    Following emergency treatment: all patient requiring follow-up care must return either to a private physician or a clinic if your condition worsens before you are able to obtain further medical attention, please return to the emergency room.     Billing Information    At [facility], we work to make the billing process streamlined for our patients.  Our Representatives are here to answer any questions you may have regarding your hospital bill.  If you have insurance coverage and have supplied your insurance information to us, we will submit a claim to your insurer on your behalf.  Should you have any questions regarding your bill, we can be reached online  or by phone as follows:  Online: You are able pay your bills online or live chat with our representatives about any billing questions you may have. We are here to help Monday - Friday from 8:00am to 7:30pm and 9:00am - 12:00pm on Saturdays.  Please visit [email or web address]  for more information.   Phone:  [phone] or [phone]    Please note that your emergency physician, surgeon, pathologist, radiologist, anesthesiologist, and other specialists are not employed by [facility] and will therefore bill separately for their services.  Please contact them directly for any questions concerning their bills at the numbers below:     Emergency Physician Services:  [phone]  [facility]:  [phone]  [facility]:  [phone]  [facility]:  [phone]    1. Your final bill may vary from the amount quoted upon discharge if all procedures are not complete at that time, or if your doctor has additional procedures of which we are not aware. You will receive an additional bill if you return to the Emergency Department at [facility] for suture removal regardless of the facility of which the sutures were placed.     2. Please arrange for settlement of this account at the emergency registration.    3. All self-pay accounts are due in full at the time of treatment.  If you are unable to meet this obligation then payment is expected within 4-5 days.     4. If you have had radiology studies (CT, X-ray, Ultrasound, MRI), you have received a preliminary result during your emergency department visit. Please contact the radiology department [phone] to receive a copy of your final result. Please discuss the Final result with your primary physician or with the follow up physician provided.     Crisis Hotline:  [place] Crisis Hotline:  2-192-DCQHUZX or 1-247.446.7284  [place] Crisis Hotline:    1-999.258.8704 or 123-607-3920         ED  Discharge Follow Up Questions    1. In order to provide you with very good care, we would like to follow up with a phone call in the next few days.  May we have your permission to contact you?     YES /  NO    2. What is the best phone number to call you? (       )_____-__________    3. What is the best time to call you?      Morning  /  Afternoon  /  Evening                   Patient Signature:  ____________________________________________________________    Date:  ____________________________________________________________

## 2017-04-16 NOTE — ED NOTES
Benny Rodriguez II  31 y.o. male  Chief Complaint   Patient presents with   • Abscess     Left knee. Radius has increased since this AM.      Pt amb to triage with steady gait for above complaint. Left knee is red and warm to the touch. No streaking observed.  Pt denies fevers/chills. No   Pt placed in lobby. Pt educated on triage process. Pt encouraged to alert staff for any changes.

## 2017-05-16 ENCOUNTER — TELEPHONE (OUTPATIENT)
Dept: MEDICAL GROUP | Facility: PHYSICIAN GROUP | Age: 32
End: 2017-05-16

## 2017-05-16 NOTE — TELEPHONE ENCOUNTER
Called Benny Rodriguez II and left a message to return my call regarding his/her upcoming NP appointment with [facility].   If patient returns the call please transfer them to extension: [phone]

## 2017-05-18 ENCOUNTER — OFFICE VISIT (OUTPATIENT)
Dept: MEDICAL GROUP | Facility: PHYSICIAN GROUP | Age: 32
End: 2017-05-18
Payer: COMMERCIAL

## 2017-05-18 VITALS
HEART RATE: 64 BPM | BODY MASS INDEX: 29.12 KG/M2 | HEIGHT: 71 IN | TEMPERATURE: 98.8 F | DIASTOLIC BLOOD PRESSURE: 70 MMHG | SYSTOLIC BLOOD PRESSURE: 110 MMHG | RESPIRATION RATE: 18 BRPM | OXYGEN SATURATION: 97 % | WEIGHT: 208 LBS

## 2017-05-18 DIAGNOSIS — K76.0 NON-ALCOHOLIC FATTY LIVER DISEASE: ICD-10-CM

## 2017-05-18 DIAGNOSIS — E80.4 GILBERT SYNDROME: ICD-10-CM

## 2017-05-18 DIAGNOSIS — Z98.52 STATUS POST VASECTOMY: ICD-10-CM

## 2017-05-18 PROCEDURE — 99213 OFFICE O/P EST LOW 20 MIN: CPT | Performed by: NURSE PRACTITIONER

## 2017-05-18 ASSESSMENT — PAIN SCALES - GENERAL: PAINLEVEL: NO PAIN

## 2017-05-18 ASSESSMENT — PATIENT HEALTH QUESTIONNAIRE - PHQ9: CLINICAL INTERPRETATION OF PHQ2 SCORE: 2

## 2017-05-18 NOTE — ASSESSMENT & PLAN NOTE
Chronic in nature. Stable. Patient is not currently having any issues with this at this time. Patient does not follow with gastroenterology. [facility]

## 2017-05-18 NOTE — PROGRESS NOTES
Chief Complaint   Patient presents with   • Establish Care       HISTORY OF THE PRESENT ILLNESS: This is a 31 y.o. male new patient to our practice. This pleasant patient is here today to establish care.    Gilbert syndrome  Chronic in nature. Stable. Patient is not currently having any issues with this at this time. Patient does not follow with gastroenterology. [facility].    Non-alcoholic fatty liver disease  Chronic in nature. Stable per patient. [facility] regarding [facility] of this condition. Patient states he had labs drawn approximately one month ago. Patient denies abdominal pain, nausea, vomiting, change in stool. Discussed with patient that these symptoms are reasons to follow-up.      Past Medical History   Diagnosis Date   • Gilbert's syndrome    • Non-alcoholic fatty liver disease        Past Surgical History   Procedure Laterality Date   • Appendectomy     • Vasectomy         Family Status   Relation Status Death Age   • Mother Other      unknown history   • Father       Family History   Problem Relation Age of Onset   • Hypertension Father    • Stroke Father        Social History   Substance Use Topics   • Smoking status: Never Smoker    • Smokeless tobacco: Never Used   • Alcohol Use: 1.2 oz/week     2 Cans of beer per week       Allergies: Bactrim ds and Keflex    No current [facility]-ordered outpatient prescriptions on file.     No current [facility]-ordered facility-administered medications on file.       Review of Systems   Constitutional:  Negative for fever, chills, weight loss and malaise/fatigue.   HENT:  Negative for ear pain, nosebleeds, congestion, sore throat and neck pain.    Eyes:  Negative for blurred vision.   Respiratory:  Negative for cough, sputum production, shortness of breath and wheezing.    Cardiovascular:  Negative for chest pain, palpitations, orthopnea and leg swelling.   Gastrointestinal:  Negative for heartburn,  "nausea, vomiting and abdominal pain.   Genitourinary:  Negative for dysuria, urgency and frequency.   Musculoskeletal:  Negative for myalgias, back pain and joint pain.   Skin:  Negative for rash and itching.   Neurological:  Negative for dizziness, tingling, tremors, sensory change, focal weakness and headaches.   Endo/Heme/Allergies:  Does not bruise/bleed easily.   Psychiatric/Behavioral:  Negative for depression, anxiety, or memory loss.     All other systems reviewed and are negative except as in HPI.    Exam: Blood pressure 110/70, pulse 64, temperature 37.1 °C (98.8 °F), resp. rate 18, height 1.803 m (5' 10.98\"), weight 94.348 kg (208 lb), SpO2 97 %.  General:  Normal appearing. No distress.  HEENT:  Normocephalic. Eyes conjunctiva clear lids without ptosis, pupils equal and reactive to light accommodation, ears normal shape and contour, canals are clear bilaterally, tympanic membranes are benign, nasal mucosa benign, oropharynx is without erythema, edema or exudates.   Neck:  Supple without JVD or bruit. Thyroid is not enlarged.  Pulmonary:  Clear to ausculation.  Normal effort. No rales, ronchi, or wheezing.  Cardiovascular:  Regular rate and rhythm without murmur. Carotid and radial pulses are intact and equal bilaterally.  Abdomen:  Soft, nontender, nondistended. Normal bowel sounds. Liver and spleen are not palpable  Neurologic:  Grossly nonfocal  Lymph:  No cervical, supraclavicular or axillary lymph nodes are palpable  Skin:  Warm and dry.  No obvious lesions.  Musculoskeletal:  Normal gait. No extremity cyanosis, clubbing, or edema.  Psych:  Normal mood and affect. Alert and oriented x3. Judgment and insight is normal.    Medical decision making: Benny is a generally well-appearing 31-year-old male patient here today to establish care. [facility]. With regard to nonalcoholic fatty liver disease and gilbert's syndrome records are requested [facility] the [facility] as patient " had labs approximately one month ago and states that he has had workup and is told not to be concerned about this at this time. Patient is encouraged to be seen in the emergency room for chest pain, palpitations, shortness of breath, dizziness, severe abdominal pain or other concerning symptoms.    Please note that this dictation was created using voice recognition software. I have made every reasonable attempt to correct obvious errors, but I expect that there are errors of grammar and possibly content that I did not discover before finalizing the note.      Assessment/Plan  1. Non-alcoholic fatty liver disease     2. Gilbert syndrome     3. Status post vasectomy  REFERRAL TO UROLOGY         I have placed the below orders and discussed them with an approved delegating provider. The MA is performing the below orders under the direction of Dr. Gonzales.

## 2017-05-18 NOTE — ASSESSMENT & PLAN NOTE
Chronic in nature. Stable per patient. [facility] condition. Patient states he had labs drawn approximately one month ago. Patient denies abdominal pain, nausea, vomiting, change in stool. Discussed with patient that these symptoms are reasons to follow-up.

## 2017-05-18 NOTE — MR AVS SNAPSHOT
"        Benny Rodriguez II   2017 9:00 AM   Office Visit   MRN: [id number]    Department:  [facility]   Dept Phone:  [phone]    Description:  Male : 1985   Provider:  ALVARO Winn           Reason for Visit     Establish Care           Allergies as of 2017     Allergen Noted Reactions    Bactrim Ds [Sulfamethoxazole-Trimethoprim] 2017   Hives    Keflex 2017   Hives, Swelling    redness      You were diagnosed with     Non-alcoholic fatty liver disease   [021657]       Gilbert syndrome   [510964]       Status post vasectomy   [161754]         Vital Signs     Blood Pressure Pulse Temperature Respirations Height Weight    110/70 mmHg 64 37.1 °C (98.8 °F) 18 1.803 m (5' 10.98\") 94.348 kg (208 lb)    Body Mass Index Oxygen Saturation Smoking Status             29.02 kg/m2 97% Never Smoker          Basic Information     Date Of Birth Sex Race Ethnicity Preferred Language    1985 Male White Non- English      Problem List              ICD-10-CM Priority Class Noted - Resolved    Non-alcoholic fatty liver disease K76.0   2017 - Present    Gilbert syndrome E80.4   2017 - Present      Health Maintenance        Date Due Completion Dates    IMM DTaP/Tdap/Td Vaccine (1 - Tdap) 2006 ---            Current Immunizations     No immunizations on file.      Below and/or attached are the medications your provider expects you to take. Review all of your home medications and newly ordered medications with your provider and/or pharmacist. Follow medication instructions as directed by your provider and/or pharmacist. Please keep your medication list with you and share with your provider. Update the information when medications are discontinued, doses are changed, or new medications (including over-the-counter products) are added; and carry medication information at all times in the event of emergency situations     Allergies:  BACTRIM DS - Hives  "    KEFLEX - Hives,Swelling               Medications  Valid as of: May 18, 2017 -  9:36 AM    Generic Name Brand Name Tablet Size Instructions for use    .                 Medicines prescribed today were sent to:     [facility] [place] [place] - [address]    [address] [place] [address]    Phone: [phone] Fax: [phone]    Open 24 Hours?: No      Medication refill instructions:       If your prescription bottle indicates you have medication refills left, it is not necessary to call your provider’s office. Please contact your pharmacy and they will refill your medication.    If your prescription bottle indicates you do not have any refills left, you may request refills at any time through one of the following ways: The online Enablon system (except Urgent Care), by calling your provider’s office, or by asking your pharmacy to contact your provider’s office with a refill request. Medication refills are processed only during regular business hours and may not be available until the next business day. Your provider may request additional information or to have a follow-up visit with you prior to refilling your medication.   *Please Note: Medication refills are assigned a new Rx number when refilled electronically. Your pharmacy may indicate that no refills were authorized even though a new prescription for the same medication is available at the pharmacy. Please request the medicine by name with the pharmacy before contacting your provider for a refill.        Referral     A referral request has been sent to our patient care coordination department. Please allow 3-5 business days for us to process this request and contact you either by phone or mail. If you do not hear from us by the 5th business day, please call us at [phone].           Enablon Access Code: [email or web address]  Expires: 6/11/2017 11:04 AM    Enablon  A secure, online tool to manage your health information     [employer]’s  OleOle® is a secure, online tool that connects you to your personalized health information from the privacy of your home -- day or night - making it very easy for you to manage your healthcare. Once the activation process is completed, you can even access your medical information using the OleOle юлия, which is available for free in the Apple Юлия store or Google Play store.     OleOle provides the following levels of access (as shown below):   My Chart Features   [facility] Doctor [facility]  Specialists [facility]  Urgent  Care Non-[facility]  Primary Care  Doctor   Email your healthcare team securely and privately 24/7 X X X    Manage appointments: schedule your next appointment; view details of past/upcoming appointments X      Request prescription refills. X      View recent personal medical records, including lab and immunizations X X X X   View health record, including health history, allergies, medications X X X X   Read reports about your outpatient visits, procedures, consult and ER notes X X X X   See your discharge summary, which is a recap of your hospital and/or ER visit that includes your diagnosis, lab results, and care plan. X X       How to register for OleOle:  1. Go to  [email or web address].  2. Click on the Sign Up Now box, which takes you to the New Member Sign Up page. You will need to provide the following information:  a. Enter your OleOle Access Code exactly as it appears at the top of this page. (You will not need to use this code after you’ve completed the sign-up process. If you do not sign up before the expiration date, you must request a new code.)   b. Enter your date of birth.   c. Enter your home email address.   d. Click Submit, and follow the next screen’s instructions.  3. Create a OleOle ID. This will be your OleOle login ID and cannot be changed, so think of one that is secure and easy to remember.  4. Create a OleOle password. You can change your password at any  time.  5. Enter your Password Reset Question and Answer. This can be used at a later time if you forget your password.   6. Enter your e-mail address. This allows you to receive e-mail notifications when new information is available in [employer].  7. Click Sign Up. You can now view your health information.    For assistance activating your DeskActive account, call [phone]

## 2017-05-18 NOTE — Clinical Note
[facility]  ALVARO Winn  [address]  [place] [address]  Fax: [phone]   Authorization for Release/Disclosure of   Protected Health Information   Name: BENNY LANGSTON II : 1985 SSN: [id number]   Address: [address] [place] [address] [address]  [place] [address] Phone:    [phone] (home)    I authorize the entity listed below to release/disclose the PHI below to:   [facility]/ALVARO Winn and ALVARO Winn   Provider or Entity Name:  [facility]   Address   City, State, Zip   Phone:      Fax:     Reason for request: continuity of care   Information to be released:    [  ] LAST COLONOSCOPY,  including any PATH REPORT and follow-up  [  ] LAST FIT/COLOGUARD RESULT [  ] LAST DEXA  [  ] LAST MAMMOGRAM  [  ] LAST PAP  [x] LAST LABS [  ] RETINA EXAM REPORT  [  ] IMMUNIZATION RECORDS  [  ] Release all info      [  ] Check here and initial the line next to each item to release ALL health information INCLUDING  _____ Care and treatment for drug and / or alcohol abuse  _____ HIV testing, infection status, or AIDS  _____ Genetic Testing    DATES OF SERVICE OR TIME PERIOD TO BE DISCLOSED: _____________  I understand and acknowledge that:  * This Authorization may be revoked at any time by you in writing, except if your health information has already been used or disclosed.  * Your health information that will be used or disclosed as a result of you signing this authorization could be re-disclosed by the recipient. If this occurs, your re-disclosed health information may no longer be protected by State or Federal laws.  * You may refuse to sign this Authorization. Your refusal will not affect your ability to obtain treatment.  * This Authorization becomes effective upon signing and will  on (date) __________.      If no date is indicated, this Authorization will  one (1) year from the signature date.    Name: Benny Barnard  Jennifer DE GUZMAN    Signature:   Date:     5/18/2017       PLEASE FAX REQUESTED RECORDS BACK TO: [phone]

## 2017-05-23 ENCOUNTER — OFFICE VISIT (OUTPATIENT)
Dept: URGENT CARE | Facility: CLINIC | Age: 32
End: 2017-05-23
Payer: COMMERCIAL

## 2017-05-23 VITALS
HEIGHT: 71 IN | HEART RATE: 80 BPM | DIASTOLIC BLOOD PRESSURE: 80 MMHG | SYSTOLIC BLOOD PRESSURE: 118 MMHG | BODY MASS INDEX: 28.48 KG/M2 | TEMPERATURE: 98.2 F | RESPIRATION RATE: 16 BRPM | OXYGEN SATURATION: 100 % | WEIGHT: 203.4 LBS

## 2017-05-23 DIAGNOSIS — S39.011A STRAIN OF MUSCLE OF RIGHT GROIN REGION: ICD-10-CM

## 2017-05-23 PROCEDURE — 99213 OFFICE O/P EST LOW 20 MIN: CPT | Performed by: NURSE PRACTITIONER

## 2017-05-23 ASSESSMENT — ENCOUNTER SYMPTOMS: FEVER: 0

## 2017-05-23 NOTE — PATIENT INSTRUCTIONS
Groin Strain  A groin strain (also called a groin pull) is an injury to the muscles or tendon on the upper inner part of the thigh. These muscles are called the adductor muscles or groin muscles. They are responsible for moving the leg across the body. A muscle strain occurs when a muscle is overstretched and some muscle fibers are torn. A groin strain can range from mild to severe depending on how many muscle fibers are affected and whether the muscle fibers are partially or completely torn.   Groin strains usually occur during exercise or participation in sports. The injury often happens when a sudden, violent force is placed on a muscle, stretching the muscle too far. A strain is more likely to occur when your muscles are not warmed up or if you are not properly conditioned. Depending on the severity of the groin strain, recovery time may vary from a few weeks to several weeks. Severe injuries often require 4-6 weeks for recovery. In these cases, complete healing can take 4-5 months.   CAUSES   · Stretching the groin muscles too far or too suddenly, often during side-to-side motion with an abrupt change in direction.  · Putting repeated stress on the groin muscles over a long period of time.  · Performing vigorous activity without properly stretching the groin muscles beforehand.  SYMPTOMS   · Pain and tenderness in the groin area. This begins as sharp pain and persists as a dull ache.  · Popping or snapping feeling when the injury occurs (for severe strains).  · Swelling or bruising.  · Muscle spasms.  · Weakness in the leg.  · Stiffness in the groin area with decreased ability to move the affected muscles.  DIAGNOSIS   Your caregiver will perform a physical exam to diagnose a groin strain. You will be asked about your symptoms and how the injury occurred. X-rays are sometimes needed to rule out a broken bone or cartilage problems. Your caregiver may order a CT scan or MRI if a complete muscle tear is  suspected.  TREATMENT   A groin strain will often heal on its own. Your caregiver may prescribe medicines to help manage pain and swelling (anti-inflammatory medicine). You may be told to use crutches for the first few days to minimize your pain.  HOME CARE INSTRUCTIONS   · Rest. Do not use the strained muscle if it causes pain.  · Put ice on the injured area.  ¨ Put ice in a plastic bag.  ¨ Place a towel between your skin and the bag.  ¨ Leave the ice on for 15-20 minutes, every 2-3 hours. Do this for the first 2 days after the injury.   · Only take over-the-counter or prescription medicines as directed by your caregiver.  · Wrap the injured area with an elastic bandage as directed by your caregiver.  · Keep the injured leg raised (elevated).  · Walk, stretch, and perform range-of-motion exercises to improve blood flow to the injured area. Only perform these activities if you can do so without any pain.  To prevent muscle strains:  · Warm up before exercise.  · Develop proper conditioning and strength in the groin muscles.  SEEK IMMEDIATE MEDICAL CARE IF:   · You have increased pain or swelling in the affected area.    · Your symptoms are not improving or are getting worse.  MAKE SURE YOU:   · Understand these instructions.  · Will watch your condition.  · Will get help right away if you are not doing well or get worse.     This information is not intended to replace advice given to you by your health care provider. Make sure you discuss any questions you have with your health care provider.     Document Released: 08/15/2005 Document Revised: 12/04/2013 Document Reviewed: 08/21/2013  [employer] Interactive Patient Education ©2016 [employer]

## 2017-05-23 NOTE — PROGRESS NOTES
"Subjective:      Benny Rodriguez II is a 31 y.o. male who presents with Groin Pain            Groin Pain  This is a new problem. The current episode started yesterday (He was playing kick ball, ran for the ball and felt a pop in his groin on the right side. Has had pain ever since). The problem occurs constantly. The problem has been unchanged. Pertinent negatives include no fever. Associated symptoms comments: R groin pain. Pain with walking. The symptoms are aggravated by walking and exertion. He has tried NSAIDs for the symptoms. The treatment provided no relief.       Review of Systems   Constitutional: Negative for fever.   Musculoskeletal:        R groin pain   All other systems reviewed and are negative.    Past Medical History   Diagnosis Date   • Gilbert's syndrome    • Non-alcoholic fatty liver disease       Past Surgical History   Procedure Laterality Date   • Appendectomy     • Vasectomy        Social History     Social History   • Marital Status:      Spouse Name: N/A   • Number of Children: N/A   • Years of Education: N/A     Occupational History   • Not on file.     Social History Main Topics   • Smoking status: Never Smoker    • Smokeless tobacco: Never Used   • Alcohol Use: 1.2 oz/week     2 Cans of beer per week   • Drug Use: No   • Sexual Activity:     Partners: Female     Birth Control/ Protection: Surgical     Other Topics Concern   • Not on file     Social History Narrative          Objective:     /80 mmHg  Pulse 80  Temp(Src) 36.8 °C (98.2 °F)  Resp 16  Ht 1.803 m (5' 11\")  Wt 92.262 kg (203 lb 6.4 oz)  BMI 28.38 kg/m2  SpO2 100%     Physical Exam   Constitutional: He is oriented to person, place, and time. Vital signs are normal. He appears well-developed and well-nourished.   HENT:   Head: Normocephalic and atraumatic.   Eyes: EOM are normal. Pupils are equal, round, and reactive to light.   Neck: Normal range of motion.   Cardiovascular: Normal rate and regular " rhythm.    Pulmonary/Chest: Effort normal.   Musculoskeletal:        Right hip: He exhibits decreased range of motion, decreased strength, tenderness and swelling (mild STS). He exhibits no bony tenderness, no crepitus and no deformity.   Pain and decreased strength against resistance with adduction and flexion  Antalgic gait   Neurological: He is alert and oriented to person, place, and time. He has normal strength. No cranial nerve deficit or sensory deficit.   Skin: Skin is warm and dry.   Psychiatric: He has a normal mood and affect. His speech is normal and behavior is normal. Thought content normal.   Vitals reviewed.              Assessment/Plan:     1. Strain of muscle of right groin region    Ice and heat compresses for the next 48 hours  Adductor stretches and info provided  Advised him to remain out of kick ball games for at least one week with proper warm up and stretches before games or practices  Ibuprofen 600mg q6h PRN pain  RTC in 2 weeks if pain does not improve, discussed imaging at that time, he V/U    Supportive care, differential diagnoses, and indications for immediate follow-up discussed with patient.    Pathogenesis of diagnosis discussed including typical length and natural progression.      Instructed to return to  or nearest emergency department if symptoms fail to improve, for any change in condition, further concerns, or new concerning symptoms.  Patient states understanding of the plan of care and discharge instructions.

## 2017-05-23 NOTE — MR AVS SNAPSHOT
"        Benny Rodriguez II   2017 8:30 AM   Office Visit   MRN: [id number]    Department:  [facility]   Dept Phone:  [phone]    Description:  Male : 1985   Provider:  ALVARO Luque           Reason for Visit     Groin Pain x was playing kickball and when he kicked it he felt a pop on his R side groin, still having pain today       Allergies as of 2017     Allergen Noted Reactions    Bactrim Ds [Sulfamethoxazole-Trimethoprim] 2017   Hives    Keflex 2017   Hives, Swelling    redness      You were diagnosed with     Strain of muscle of right groin region   [[id number]]         Vital Signs     Blood Pressure Pulse Temperature Respirations Height Weight    118/80 mmHg 80 36.8 °C (98.2 °F) 16 1.803 m (5' 11\") 92.262 kg (203 lb 6.4 oz)    Body Mass Index Oxygen Saturation Smoking Status             28.38 kg/m2 100% Never Smoker          Basic Information     Date Of Birth Sex Race Ethnicity Preferred Language    1985 Male White Non- English      Problem List              ICD-10-CM Priority Class Noted - Resolved    Non-alcoholic fatty liver disease K76.0   2017 - Present    Gilbert syndrome E80.4   2017 - Present      Health Maintenance        Date Due Completion Dates    IMM DTaP/Tdap/Td Vaccine (1 - Tdap) 2006 ---            Current Immunizations     No immunizations on file.      Below and/or attached are the medications your provider expects you to take. Review all of your home medications and newly ordered medications with your provider and/or pharmacist. Follow medication instructions as directed by your provider and/or pharmacist. Please keep your medication list with you and share with your provider. Update the information when medications are discontinued, doses are changed, or new medications (including over-the-counter products) are added; and carry medication information at all times in the event of emergency situations    "    Allergies:  BACTRIM DS - Hives     KEFLEX - Hives,Swelling               Medications  Valid as of: May 23, 2017 -  9:16 AM    Generic Name Brand Name Tablet Size Instructions for use    .                 Medicines prescribed today were sent to:     [facility] [place] - [address]    [address] [place] [address]    Phone: [phone] Fax: [phone]    Open 24 Hours?: No      Medication refill instructions:       If your prescription bottle indicates you have medication refills left, it is not necessary to call your provider’s office. Please contact your pharmacy and they will refill your medication.    If your prescription bottle indicates you do not have any refills left, you may request refills at any time through one of the following ways: The online [employer] system (except Urgent Care), by calling your provider’s office, or by asking your pharmacy to contact your provider’s office with a refill request. Medication refills are processed only during regular business hours and may not be available until the next business day. Your provider may request additional information or to have a follow-up visit with you prior to refilling your medication.   *Please Note: Medication refills are assigned a new Rx number when refilled electronically. Your pharmacy may indicate that no refills were authorized even though a new prescription for the same medication is available at the pharmacy. Please request the medicine by name with the pharmacy before contacting your provider for a refill.        Instructions    Groin Strain  A groin strain (also called a groin pull) is an injury to the muscles or tendon on the upper inner part of the thigh. These muscles are called the adductor muscles or groin muscles. They are responsible for moving the leg across the body. A muscle strain occurs when a muscle is overstretched and some muscle fibers are torn. A groin strain can range from mild to severe depending on  how many muscle fibers are affected and whether the muscle fibers are partially or completely torn.   Groin strains usually occur during exercise or participation in sports. The injury often happens when a sudden, violent force is placed on a muscle, stretching the muscle too far. A strain is more likely to occur when your muscles are not warmed up or if you are not properly conditioned. Depending on the severity of the groin strain, recovery time may vary from a few weeks to several weeks. Severe injuries often require 4-6 weeks for recovery. In these cases, complete healing can take 4-5 months.   CAUSES   · Stretching the groin muscles too far or too suddenly, often during side-to-side motion with an abrupt change in direction.  · Putting repeated stress on the groin muscles over a long period of time.  · Performing vigorous activity without properly stretching the groin muscles beforehand.  SYMPTOMS   · Pain and tenderness in the groin area. This begins as sharp pain and persists as a dull ache.  · Popping or snapping feeling when the injury occurs (for severe strains).  · Swelling or bruising.  · Muscle spasms.  · Weakness in the leg.  · Stiffness in the groin area with decreased ability to move the affected muscles.  DIAGNOSIS   Your caregiver will perform a physical exam to diagnose a groin strain. You will be asked about your symptoms and how the injury occurred. X-rays are sometimes needed to rule out a broken bone or cartilage problems. Your caregiver may order a CT scan or MRI if a complete muscle tear is suspected.  TREATMENT   A groin strain will often heal on its own. Your caregiver may prescribe medicines to help manage pain and swelling (anti-inflammatory medicine). You may be told to use crutches for the first few days to minimize your pain.  HOME CARE INSTRUCTIONS   · Rest. Do not use the strained muscle if it causes pain.  · Put ice on the injured area.  ¨ Put ice in a plastic bag.  ¨ Place a towel  between your skin and the bag.  ¨ Leave the ice on for 15-20 minutes, every 2-3 hours. Do this for the first 2 days after the injury.   · Only take over-the-counter or prescription medicines as directed by your caregiver.  · Wrap the injured area with an elastic bandage as directed by your caregiver.  · Keep the injured leg raised (elevated).  · Walk, stretch, and perform range-of-motion exercises to improve blood flow to the injured area. Only perform these activities if you can do so without any pain.  To prevent muscle strains:  · Warm up before exercise.  · Develop proper conditioning and strength in the groin muscles.  SEEK IMMEDIATE MEDICAL CARE IF:   · You have increased pain or swelling in the affected area.    · Your symptoms are not improving or are getting worse.  MAKE SURE YOU:   · Understand these instructions.  · Will watch your condition.  · Will get help right away if you are not doing well or get worse.     This information is not intended to replace advice given to you by your health care provider. Make sure you discuss any questions you have with your health care provider.     Document Released: 08/15/2005 Document Revised: 12/04/2013 Document Reviewed: 08/21/2013  [employer] Interactive Patient Education ©2016 [employer]            [employer] Access Code: [email or web address]  Expires: 6/11/2017 11:04 AM    [employer]  A secure, online tool to manage your health information     [employer]® is a secure, online tool that connects you to your personalized health information from the privacy of your home -- day or night - making it very easy for you to manage your healthcare. Once the activation process is completed, you can even access your medical information using the [employer]лия, which is available for free in the [employer] store or Google Play store.     [employer] provides the following levels of access (as shown below):   My Chart Features   [facility]  [facility]  Urgent  Care [facility]  Primary Care  Doctor   Email your healthcare team securely and privately 24/7 X X X    Manage appointments: schedule your next appointment; view details of past/upcoming appointments X      Request prescription refills. X      View recent personal medical records, including lab and immunizations X X X X   View health record, including health history, allergies, medications X X X X   Read reports about your outpatient visits, procedures, consult and ER notes X X X X   See your discharge summary, which is a recap of your hospital and/or ER visit that includes your diagnosis, lab results, and care plan. X X       How to register for [employer]:  1. Go to  [email or web address].  2. Click on the Sign Up Now box, which takes you to the New Member Sign Up page. You will need to provide the following information:  a. Enter your [employer] Access Code exactly as it appears at the top of this page. (You will not need to use this code after you’ve completed the sign-up process. If you do not sign up before the expiration date, you must request a new code.)   b. Enter your date of birth.   c. Enter your home email address.   d. Click Submit, and follow the next screen’s instructions.  3. Create a [employer] ID. This will be your [employer] login ID and cannot be changed, so think of one that is secure and easy to remember.  4. Create a [employer] password. You can change your password at any time.  5. Enter your Password Reset Question and Answer. This can be used at a later time if you forget your password.   6. Enter your e-mail address. This allows you to receive e-mail notifications when new information is available in [employer].  7. Click Sign Up. You can now view your health information.    For assistance activating your [employer] account, call [phone]

## 2017-08-16 ENCOUNTER — OFFICE VISIT (OUTPATIENT)
Dept: MEDICAL GROUP | Facility: PHYSICIAN GROUP | Age: 32
End: 2017-08-16
Payer: COMMERCIAL

## 2017-08-16 VITALS
RESPIRATION RATE: 18 BRPM | BODY MASS INDEX: 28.84 KG/M2 | WEIGHT: 206 LBS | TEMPERATURE: 98.1 F | SYSTOLIC BLOOD PRESSURE: 130 MMHG | HEART RATE: 70 BPM | HEIGHT: 71 IN | OXYGEN SATURATION: 100 % | DIASTOLIC BLOOD PRESSURE: 90 MMHG

## 2017-08-16 DIAGNOSIS — B08.1 MOLLUSCUM CONTAGIOSUM: ICD-10-CM

## 2017-08-16 PROCEDURE — 17000 DESTRUCT PREMALG LESION: CPT | Performed by: INTERNAL MEDICINE

## 2017-08-16 PROCEDURE — 99213 OFFICE O/P EST LOW 20 MIN: CPT | Mod: 25 | Performed by: INTERNAL MEDICINE

## 2017-08-16 NOTE — PROGRESS NOTES
"Subjective:  Benny is a 32 y.o. male with the following   Past Medical History   Diagnosis Date   • Gilbert's syndrome    • Non-alcoholic fatty liver disease       Family History   Problem Relation Age of Onset   • Hypertension Father    • Stroke Father      The patient is on the following medications: No current outpatient prescriptions on file.    HPI; patient is here today concerned about right lower quadrant raised the skin lesion, noticed several months ago otherwise asymptomatic  ROS:  See HPI    Blood pressure 130/90, pulse 70, temperature 36.7 °C (98.1 °F), resp. rate 18, height 1.803 m (5' 10.98\"), weight 93.441 kg (206 lb), SpO2 100 %.on RA  Objective:  Patient is well appearing and in no acute distress.  Pharynx is clear.  Neck is soft and supple with no cervical or supraclavicular lymphadenopathy, thyromegaly or masses, no JVD.  Lungs clear to auscultation bilaterally with normal respiratory effort. Abdomen soft, non-tender on palpation,not distended. Heart regular rate and rhythm without murmur. Extremities without any clubbing, cyanosis, or edema. Right lower quadrant skin; small nodular lesion with internal crater.     Assessment and Plan:     1. Molluscum contagiosum ; involving right lower quadrant skin.       Patient was advised regarding skin lesion and potential underlying etiologies , discussed therapeutic option, prefers cryotherapy. Lesion was treated with cryotherapy. If symptoms not improved or worsen return for evaluation.      Please note that this dictation was created using voice recognition software. I have worked with consultants from the vendor as well as technical experts from [facility] [employer] to optimize the interface. I have made every reasonable attempt to correct obvious errors, but I expect that there are errors of grammar and possibly content that I did not discover before finalizing the note.  "

## 2017-08-16 NOTE — MR AVS SNAPSHOT
"        Benny Rodriguez II   2017 11:00 AM   Office Visit   MRN: [id number]    Department:  [facility]   Dept Phone:  [phone]    Description:  Male : 1985   Provider:  Nick Modi M.D.           Reason for Visit     Cyst stomach      Allergies as of 2017     Allergen Noted Reactions    Bactrim Ds [Sulfamethoxazole-Trimethoprim] 2017   Hives    Keflex 2017   Hives, Swelling    redness      You were diagnosed with     Molluscum contagiosum   [078.0.ICD-9-CM]         Vital Signs     Blood Pressure Pulse Temperature Respirations Height Weight    130/90 mmHg 70 36.7 °C (98.1 °F) 18 1.803 m (5' 10.98\") 93.441 kg (206 lb)    Body Mass Index Oxygen Saturation Smoking Status             28.74 kg/m2 100% Never Smoker          Basic Information     Date Of Birth Sex Race Ethnicity Preferred Language    1985 Male White Non- English      Your appointments     Aug 28, 2017  7:00 AM   ANNUAL EXAM PREVENTATIVE with FRANCISCO WinnPKushalRAYAD   [facility] (--)    [address]  [address]  [facility] [address] [phone]              Problem List              ICD-10-CM Priority Class Noted - Resolved    Non-alcoholic fatty liver disease K76.0   2017 - Present    Gilbert syndrome E80.4   2017 - Present      Health Maintenance        Date Due Completion Dates    IMM DTaP/Tdap/Td Vaccine (1 - Tdap) 2006 ---    IMM INFLUENZA (1) 2017 ---            Current Immunizations     No immunizations on file.      Below and/or attached are the medications your provider expects you to take. Review all of your home medications and newly ordered medications with your provider and/or pharmacist. Follow medication instructions as directed by your provider and/or pharmacist. Please keep your medication list with you and share with your provider. Update the information when medications are discontinued, doses are changed, or new medications " (including over-the-counter products) are added; and carry medication information at all times in the event of emergency situations     Allergies:  BACTRIM DS - Hives     KEFLEX - Hives,Swelling               Medications  Valid as of: August 16, 2017 - 12:32 PM    Generic Name Brand Name Tablet Size Instructions for use    .                 Medicines prescribed today were sent to:     [id number] [place], [place] - [address]    [address] [place] [address]    Phone: [phone] Fax: [phone]    Open 24 Hours?: No      Medication refill instructions:       If your prescription bottle indicates you have medication refills left, it is not necessary to call your provider’s office. Please contact your pharmacy and they will refill your medication.    If your prescription bottle indicates you do not have any refills left, you may request refills at any time through one of the following ways: The online Careem system (except Urgent Care), by calling your provider’s office, or by asking your pharmacy to contact your provider’s office with a refill request. Medication refills are processed only during regular business hours and may not be available until the next business day. Your provider may request additional information or to have a follow-up visit with you prior to refilling your medication.   *Please Note: Medication refills are assigned a new Rx number when refilled electronically. Your pharmacy may indicate that no refills were authorized even though a new prescription for the same medication is available at the pharmacy. Please request the medicine by name with the pharmacy before contacting your provider for a refill.        Instructions    Patient is instructed regarding acute on chronic issues.          Careem Access Code: [email or web address]  Expires: 9/15/2017 12:32 PM    Careem  A secure, online tool to manage your health information     [email or web address]’s Careem® is a secure, online tool that  connects you to your personalized health information from the privacy of your home -- day or night - making it very easy for you to manage your healthcare. Once the activation process is completed, you can even access your medical information using the ResolutionTube юлия, which is available for free in the Apple Юлия store or Google Play store.     ResolutionTube provides the following levels of access (as shown below):   My Chart Features   [facility] Doctor [facility]  Specialists [facility]  Urgent  Care Non-[facility]  Primary Care  Doctor   Email your healthcare team securely and privately 24/7 X X X    Manage appointments: schedule your next appointment; view details of past/upcoming appointments X      Request prescription refills. X      View recent personal medical records, including lab and immunizations X X X X   View health record, including health history, allergies, medications X X X X   Read reports about your outpatient visits, procedures, consult and ER notes X X X X   See your discharge summary, which is a recap of your hospital and/or ER visit that includes your diagnosis, lab results, and care plan. X X       How to register for ResolutionTube:  1. Go to  [email or web address].  2. Click on the Sign Up Now box, which takes you to the New Member Sign Up page. You will need to provide the following information:  a. Enter your ResolutionTube Access Code exactly as it appears at the top of this page. (You will not need to use this code after you’ve completed the sign-up process. If you do not sign up before the expiration date, you must request a new code.)   b. Enter your date of birth.   c. Enter your home email address.   d. Click Submit, and follow the next screen’s instructions.  3. Create a ResolutionTube ID. This will be your ResolutionTube login ID and cannot be changed, so think of one that is secure and easy to remember.  4. Create a ResolutionTube password. You can change your password at any time.  5. Enter your Password Reset Question  and Answer. This can be used at a later time if you forget your password.   6. Enter your e-mail address. This allows you to receive e-mail notifications when new information is available in [email or web address].  7. Click Sign Up. You can now view your health information.    For assistance activating your [email or web address] account, call [phone]

## 2017-09-12 ENCOUNTER — HOSPITAL ENCOUNTER (OUTPATIENT)
Dept: LAB | Facility: MEDICAL CENTER | Age: 32
End: 2017-09-12
Attending: NURSE PRACTITIONER
Payer: COMMERCIAL

## 2017-09-12 ENCOUNTER — OFFICE VISIT (OUTPATIENT)
Dept: MEDICAL GROUP | Facility: PHYSICIAN GROUP | Age: 32
End: 2017-09-12
Payer: COMMERCIAL

## 2017-09-12 VITALS
SYSTOLIC BLOOD PRESSURE: 122 MMHG | OXYGEN SATURATION: 97 % | HEART RATE: 58 BPM | BODY MASS INDEX: 28.84 KG/M2 | RESPIRATION RATE: 16 BRPM | TEMPERATURE: 98.7 F | DIASTOLIC BLOOD PRESSURE: 80 MMHG | HEIGHT: 71 IN | WEIGHT: 206 LBS

## 2017-09-12 DIAGNOSIS — A60.02 HERPES GENITALIS IN MEN: ICD-10-CM

## 2017-09-12 DIAGNOSIS — Z11.3 SCREENING FOR STD (SEXUALLY TRANSMITTED DISEASE): ICD-10-CM

## 2017-09-12 DIAGNOSIS — B08.1 MOLLUSCUM CONTAGIOSUM INFECTION: ICD-10-CM

## 2017-09-12 LAB
HIV 1+2 AB+HIV1 P24 AG SERPL QL IA: NON REACTIVE
TREPONEMA PALLIDUM IGG+IGM AB [PRESENCE] IN SERUM OR PLASMA BY IMMUNOASSAY: NON REACTIVE

## 2017-09-12 PROCEDURE — 99213 OFFICE O/P EST LOW 20 MIN: CPT | Performed by: NURSE PRACTITIONER

## 2017-09-12 PROCEDURE — 86780 TREPONEMA PALLIDUM: CPT

## 2017-09-12 PROCEDURE — 36415 COLL VENOUS BLD VENIPUNCTURE: CPT

## 2017-09-12 PROCEDURE — 87389 HIV-1 AG W/HIV-1&-2 AB AG IA: CPT

## 2017-09-12 PROCEDURE — 87591 N.GONORRHOEAE DNA AMP PROB: CPT

## 2017-09-12 PROCEDURE — 87491 CHLMYD TRACH DNA AMP PROBE: CPT

## 2017-09-12 ASSESSMENT — PAIN SCALES - GENERAL: PAINLEVEL: NO PAIN

## 2017-09-13 LAB
C TRACH DNA SPEC QL NAA+PROBE: NEGATIVE
N GONORRHOEA DNA SPEC QL NAA+PROBE: NEGATIVE
SPECIMEN SOURCE: NORMAL

## 2017-09-13 NOTE — PROGRESS NOTES
Chief Complaint   Patient presents with   • Other     rash x 1 month        HISTORY OF PRESENT ILLNESS: Patient is a 32 y.o. male established patient who presents today toDiscuss molluscum, herpes.    Molluscum contagiosum infection  Patient states he was seen by Dr. Modi for this issue states that at that time he had one lesion on his abdomen patient now has multiple lesions on his abdomen as well as several on his right arm, 2 on his leg. Patient states his are not itchy but is concerned that they are spreading.  Patient states they're not painful. Patient is concerned that they are spreading, states he has a scar from the cryo.    Herpes genitalis in men  Chronic in nature. Stable. No current outbreak per patient. Patient was concerned that the lesions on his body might be herpes. Patient states that he does not have pain or issues when he has an outbreak as such she does not want treatment at this time.      Patient Active Problem List    Diagnosis Date Noted   • Molluscum contagiosum infection 2017   • Herpes genitalis in men 2017   • Non-alcoholic fatty liver disease 2017   • Gilbert syndrome 2017       Allergies:Bactrim ds [sulfamethoxazole-trimethoprim] and Keflex    No current outpatient prescriptions on file.     No current facility-administered medications for this visit.        Social History   Substance Use Topics   • Smoking status: Never Smoker   • Smokeless tobacco: Never Used   • Alcohol use 1.2 oz/week     2 Cans of beer per week       Family Status   Relation Status   • Mother Other    unknown history   • Father      Family History   Problem Relation Age of Onset   • Hypertension Father    • Stroke Father        Review of Systems:   Constitutional:  Negative for fever, chills, weight loss and malaise/fatigue.   HEENT:  Negative for ear pain, nosebleeds, congestion, sore throat and neck pain.    Eyes:  Negative for blurred vision.   Respiratory:  Negative for  "cough, sputum production, shortness of breath and wheezing.    Cardiovascular:  Negative for chest pain, palpitations, orthopnea and leg swelling.   Gastrointestinal:  Negative for heartburn, nausea, vomiting and abdominal pain.   Genitourinary:  Negative for dysuria, urgency and frequency.   Musculoskeletal:  Negative for myalgias, back pain and joint pain.   Skin: Positive for rash and negative itching.   Neurological:  Negative for dizziness, tingling, tremors, sensory change, focal weakness and headaches.   Endo/Heme/Allergies:  Does not bruise/bleed easily.   Psychiatric/Behavioral:  Negative for depression, suicidal ideas and memory loss.  The patient is not nervous/anxious and does not have insomnia.    All other systems reviewed and are negative except as in HPI.    Exam:  Blood pressure 122/80, pulse (!) 58, temperature 37.1 °C (98.7 °F), resp. rate 16, height 1.803 m (5' 10.98\"), weight 93.4 kg (206 lb), SpO2 97 %.  General:  Normal appearing. No distress.  Pulmonary:  Clear to ausculation.  Normal effort. No rales, ronchi, or wheezing.  Cardiovascular:  Regular rate and rhythm without murmur. Carotid and radial pulses are intact and equal bilaterally.  Abdomen:  Soft, nontender, nondistended. Normal bowel sounds. Liver and spleen are not palpable  Neurologic:  Grossly nonfocal  Lymph:  No cervical, supraclavicular or axillary lymph nodes are palpable  Skin:  Warm and dry. Patient has 5-7 small umbilicated rounded red lesions, there are 4-5 on his right forearm as well as 2 on the left lower leg.   Musculoskeletal:  Normal gait. No extremity cyanosis, clubbing, or edema.  Psych:  Normal mood and affect. Alert and oriented x3. Judgment and insight is normal.      PLAN:    1. Screening for STD (sexually transmitted disease)  Patient has never had STD testing in the past, states he is requesting testing at this time.  Counseled patient regarding symptoms that indicate STDs, prevention of STDs.  - " CHLAMYDIA/GC, DNA PROBE W/RFLX  - HIV ANTIBODIES; Future  - T.PALLIDUM AB EIA; Future    2. Herpes genitalis in men  Continue current management. Patient does not have a current breakout and does not wish to take Valtrex or acyclovir for this issue.    3. Molluscum contagiosum infection  Continue current management counseled patient regarding this being a viral illness that generally lesions resolve over 2 months and disease runs its course in 6-12 months. Patient is provided up-to-date handout regarding molluscum contagiosum. Patient will follow-up in one to 2 months to reassess this issue.    Patient is encouraged to be seen in the emergency room for chest pain, palpitations, shortness of breath, dizziness, severe abdominal pain or other concerning symptoms.      Please note that this dictation was created using voice recognition software. I have made every reasonable attempt to correct obvious errors, but I expect that there are errors of grammar and possibly content that I did not discover before finalizing the note.    Assessment/Plan:  1. Screening for STD (sexually transmitted disease)  CHLAMYDIA/GC, DNA PROBE W/RFLX    HIV ANTIBODIES    T.PALLIDUM AB EIA   2. Herpes genitalis in men     3. Molluscum contagiosum infection            I have placed the below orders and discussed them with an approved delegating provider. The MA is performing the below orders under the direction of Dr. Gonzales.

## 2017-09-13 NOTE — ASSESSMENT & PLAN NOTE
Chronic in nature. Stable. No current outbreak per patient. Patient was concerned that the lesions on his body might be herpes. Patient states that he does not have pain or issues when he has an outbreak as such she does not want treatment at this time.

## 2017-09-13 NOTE — ASSESSMENT & PLAN NOTE
Patient states he was seen by Dr. Modi for this issue states that at that time he had one lesion on his abdomen patient now has multiple lesions on his abdomen as well as several on his right arm, 2 on his leg. Patient states his are not itchy but is concerned that they are spreading.  Patient states they're not painful. Patient is concerned that they are spreading, states he has a scar from the cryo.

## 2017-09-14 ENCOUNTER — TELEPHONE (OUTPATIENT)
Dept: MEDICAL GROUP | Facility: PHYSICIAN GROUP | Age: 32
End: 2017-09-14

## 2017-09-14 NOTE — TELEPHONE ENCOUNTER
----- Message from ALVARO Winn sent at 9/14/2017  6:33 AM PDT -----  Please call pt and give lab results: Chlamydia, gonorrhea, HIV, syphilis are negative at this time.

## 2017-09-14 NOTE — TELEPHONE ENCOUNTER
VOICEMAIL  1. Caller Name: Benny Rodriguez II                        Call Back Number: [phone] (home)       2. Message: Called and left patient a message to call back to get lab results. LM     3. Patient approves office to leave a detailed voicemail/MyChart message: N\A

## 2018-03-25 ENCOUNTER — OFFICE VISIT (OUTPATIENT)
Dept: URGENT CARE | Facility: CLINIC | Age: 33
End: 2018-03-25
Payer: COMMERCIAL

## 2018-03-25 VITALS
BODY MASS INDEX: 28.01 KG/M2 | TEMPERATURE: 97.9 F | SYSTOLIC BLOOD PRESSURE: 124 MMHG | HEIGHT: 72 IN | RESPIRATION RATE: 16 BRPM | DIASTOLIC BLOOD PRESSURE: 70 MMHG | OXYGEN SATURATION: 98 % | WEIGHT: 206.8 LBS | HEART RATE: 85 BPM

## 2018-03-25 DIAGNOSIS — J03.90 TONSILLITIS: ICD-10-CM

## 2018-03-25 DIAGNOSIS — K12.2 UVULITIS: ICD-10-CM

## 2018-03-25 PROCEDURE — 99214 OFFICE O/P EST MOD 30 MIN: CPT | Performed by: PHYSICIAN ASSISTANT

## 2018-03-25 RX ORDER — AMOXICILLIN AND CLAVULANATE POTASSIUM 875; 125 MG/1; MG/1
1 TABLET, FILM COATED ORAL 2 TIMES DAILY
Qty: 20 TAB | Refills: 0 | Status: SHIPPED | OUTPATIENT
Start: 2018-03-25 | End: 2018-04-04

## 2018-03-25 RX ORDER — DEXAMETHASONE 4 MG/1
8 TABLET ORAL DAILY
Qty: 4 TAB | Refills: 0 | Status: SHIPPED | OUTPATIENT
Start: 2018-03-25 | End: 2018-03-27

## 2018-03-25 ASSESSMENT — ENCOUNTER SYMPTOMS
RESPIRATORY NEGATIVE: 1
SWOLLEN GLANDS: 1
COUGH: 0
TROUBLE SWALLOWING: 1
FEVER: 1
EYES NEGATIVE: 1
SORE THROAT: 1
CARDIOVASCULAR NEGATIVE: 1

## 2018-05-01 ENCOUNTER — OFFICE VISIT (OUTPATIENT)
Dept: MEDICAL GROUP | Facility: PHYSICIAN GROUP | Age: 33
End: 2018-05-01
Payer: COMMERCIAL

## 2018-05-01 VITALS
TEMPERATURE: 98.1 F | HEART RATE: 66 BPM | SYSTOLIC BLOOD PRESSURE: 126 MMHG | BODY MASS INDEX: 28.44 KG/M2 | RESPIRATION RATE: 16 BRPM | HEIGHT: 72 IN | DIASTOLIC BLOOD PRESSURE: 80 MMHG | WEIGHT: 210 LBS | OXYGEN SATURATION: 96 %

## 2018-05-01 DIAGNOSIS — R51.9 NEW ONSET HEADACHE: ICD-10-CM

## 2018-05-01 PROCEDURE — 99214 OFFICE O/P EST MOD 30 MIN: CPT | Performed by: NURSE PRACTITIONER

## 2018-05-01 RX ORDER — ALPRAZOLAM 1 MG/1
1 TABLET ORAL ONCE
Qty: 2 TAB | Refills: 0 | Status: SHIPPED | OUTPATIENT
Start: 2018-05-01 | End: 2018-05-01

## 2018-05-01 ASSESSMENT — PATIENT HEALTH QUESTIONNAIRE - PHQ9: CLINICAL INTERPRETATION OF PHQ2 SCORE: 0

## 2018-05-01 ASSESSMENT — PAIN SCALES - GENERAL: PAINLEVEL: NO PAIN

## 2018-05-01 NOTE — PROGRESS NOTES
Chief Complaint   Patient presents with   • Headache     x 1 week; more when working out        HISTORY OF PRESENT ILLNESS: Patient is a 32 y.o. male established patient who presents today to discuss new onset of throbbing headache.     New onset headache  Started approximately 1 week ago, states he started to get back into weightlifting. States that it started mid-workout states he was having severe right sided throbbing pain. States that he had to close his eyes to improve the pain. Had to stop his workout, states he took ibuprofen which helped some. States he has been drinking 3 40 oz buenrostro to stay hydrated. States he does a warm up prior to exercise. States the headaches only occur with strenuous lifting. States he has had headaches, but never such a severe headaches. States the chiropractor though he may have pinched a shoulder nerve. No family history of headaches, migraines. Dad did have stroke. States that he was ok yesterday lifting the same amount of weight. No changes in vision. Denies shoulder pain, neck pain during the episode of pain. States it happened 3-4 times last week. States that he had an adjustment with chiropractor prior to workout yesterday. States that he has had headaches after sex once or twice.       Patient Active Problem List    Diagnosis Date Noted   • New onset headache 05/01/2018   • Molluscum contagiosum infection 09/12/2017   • Herpes genitalis in men 09/12/2017   • Non-alcoholic fatty liver disease 05/18/2017   • Gilbert syndrome 05/18/2017       Allergies:Bactrim ds [sulfamethoxazole-trimethoprim] and Keflex    Current Outpatient Prescriptions   Medication Sig Dispense Refill   • ALPRAZolam (XANAX) 1 MG Tab Take 1 Tab by mouth Once for 1 dose. 2 Tab 0     No current facility-administered medications for this visit.        Social History   Substance Use Topics   • Smoking status: Never Smoker   • Smokeless tobacco: Never Used   • Alcohol use 1.2 oz/week     2 Cans of beer per  week       Family Status   Relation Status   • Mother Other    unknown history   • Father      Family History   Problem Relation Age of Onset   • Hypertension Father    • Stroke Father        Review of Systems:   Constitutional:  Negative for fever, chills, weight loss and malaise/fatigue.   HEENT:  Negative for ear pain, nosebleeds, congestion, sore throat and neck pain.    Eyes:  Negative for blurred vision.   Respiratory:  Negative for cough, sputum production, shortness of breath and wheezing.    Cardiovascular:  Negative for chest pain, palpitations, orthopnea and leg swelling.   Gastrointestinal:  Negative for heartburn, nausea, vomiting and abdominal pain.   Genitourinary:  Negative for dysuria, urgency and frequency.   Musculoskeletal:  Negative for myalgias, back pain and joint pain.   Skin:  Negative for rash and itching.   Neurological:  Negative for dizziness, tingling, tremors, sensory change, focal weakness and positive headaches.   Endo/Heme/Allergies:  Does not bruise/bleed easily.   Psychiatric/Behavioral:  Negative for depression, suicidal ideas and memory loss.  The patient is not nervous/anxious and does not have insomnia.    All other systems reviewed and are negative except as in HPI.    Exam:  Blood pressure 126/80, pulse 66, temperature 36.7 °C (98.1 °F), resp. rate 16, height 1.829 m (6'), weight 95.3 kg (210 lb), SpO2 96 %.  General:  Normal appearing. No distress.  HEENT:  Normocephalic. Eyes conjunctiva clear lids without ptosis, pupils equal and reactive to light accommodation, ears normal shape and contour, canals are clear bilaterally, tympanic membranes are benign, nasal mucosa benign, oropharynx is without erythema, edema or exudates.   Pulmonary:  Clear to ausculation.  Normal effort. No rales, ronchi, or wheezing.  Cardiovascular:  Regular rate and rhythm without murmur. Carotid and radial pulses are intact and equal bilaterally.  Abdomen:  Soft, nontender, nondistended.  Normal bowel sounds. Liver and spleen are not palpable  Neurologic:  Grossly nonfocal. Cranial nerves II through XII intact.  Skin:  Warm and dry.  No obvious lesions.  Musculoskeletal:  Normal gait. No extremity cyanosis, clubbing, or edema.  Psych:  Normal mood and affect. Alert and oriented x3. Judgment and insight is normal.      PLAN:    1. New onset headache  Patient with new onset in the last week headaches related to exertion and states that he has had headaches after sex or when nearing climax, states he has also had severe headaches after lifting weights.  patient regarding multiple factors that may be causing headaches including changing caffeine consumption, overexertion, dehydration, discussed that these could also be something more serious considering me for new onset, severe, sudden I would recommend completing an MRI. Patient will continue to use over-the-counter medications for pain management states these work well.  - MR-BRAIN-W/O; Future  - ALPRAZolam (XANAX) 1 MG Tab; Take 1 Tab by mouth Once for 1 dose.  Dispense: 2 Tab; Refill: 0    Follow-up in 1 month or as needed. Patient is encouraged to be seen in the emergency room for chest pain, palpitations, shortness of breath, dizziness, severe abdominal pain or other concerning symptoms.    Please note that this dictation was created using voice recognition software. I have made every reasonable attempt to correct obvious errors, but I expect that there are errors of grammar and possibly content that I did not discover before finalizing the note.    Assessment/Plan:  1. New onset headache  MR-BRAIN-W/O    ALPRAZolam (XANAX) 1 MG Tab          I have placed the below orders and discussed them with an approved delegating provider. The MA is performing the below orders under the direction of Dr. Gonzales.

## 2018-05-01 NOTE — ASSESSMENT & PLAN NOTE
Started approximately 1 week ago, states he started to get back into weightlifting. States that it started mid-workout states he was having severe right sided throbbing pain. States that he had to close his eyes to improve the pain. Had to stop his workout, states he took ibuprofen which helped some. States he has been drinking 3 40 oz buenrostro to stay hydrated. States he does a warm up prior to exercise. States the headaches only occur with strenuous lifting. States he has had headaches, but never such a severe headaches. States the chiropractor though he may have pinched a shoulder nerve. No family history of headaches, migraines. Dad did have stroke. States that he was ok yesterday lifting the same amount of weight. No changes in vision. Denies shoulder pain, neck pain during the episode of pain. States it happened 3-4 times last week. States that he had an adjustment with chiropractor prior to workout yesterday. States that he has had headaches after sex once or twice.

## 2018-05-09 ENCOUNTER — HOSPITAL ENCOUNTER (OUTPATIENT)
Dept: RADIOLOGY | Facility: MEDICAL CENTER | Age: 33
End: 2018-05-09
Attending: NURSE PRACTITIONER
Payer: COMMERCIAL

## 2018-05-09 DIAGNOSIS — R51.9 NEW ONSET HEADACHE: ICD-10-CM

## 2018-05-09 PROCEDURE — 70551 MRI BRAIN STEM W/O DYE: CPT

## 2018-05-10 ENCOUNTER — OFFICE VISIT (OUTPATIENT)
Dept: MEDICAL GROUP | Facility: MEDICAL CENTER | Age: 33
End: 2018-05-10
Payer: COMMERCIAL

## 2018-05-10 VITALS
TEMPERATURE: 98.2 F | HEART RATE: 72 BPM | BODY MASS INDEX: 28.43 KG/M2 | SYSTOLIC BLOOD PRESSURE: 112 MMHG | OXYGEN SATURATION: 98 % | HEIGHT: 72 IN | DIASTOLIC BLOOD PRESSURE: 72 MMHG | WEIGHT: 209.88 LBS

## 2018-05-10 DIAGNOSIS — K62.5 BRBPR (BRIGHT RED BLOOD PER RECTUM): ICD-10-CM

## 2018-05-10 DIAGNOSIS — Z00.00 PREVENTATIVE HEALTH CARE: ICD-10-CM

## 2018-05-10 PROCEDURE — 99214 OFFICE O/P EST MOD 30 MIN: CPT | Performed by: PHYSICIAN ASSISTANT

## 2018-05-10 RX ORDER — POLYETHYLENE GLYCOL 3350 17 G/17G
17 POWDER, FOR SOLUTION ORAL DAILY
Qty: 1 BOTTLE | Refills: 0 | Status: SHIPPED | OUTPATIENT
Start: 2018-05-10

## 2018-05-10 NOTE — PROGRESS NOTES
"Chief Complaint   Patient presents with   • Stool Color Change     Blood        HPI  Benny Rodriguez II is a 32 y.o. male here today for new onset BRBPR X 2 wks. Denies pain, NVD, has been more constipated recently. Pt has noticed blood when wiping for 2 wks.  Today he had a lot of blood w bowel movement. Pos mild lower abdominal pain.  No fevers, chills recent wt loss, no night sweats      Past medical, surgical, family, and social history is reviewed in Epic chart by me today.   Medications and allergies reviewed and updated in Epic chart by me today.     ROS:   As documented in history of present illness above    Exam:  Blood pressure 112/72, pulse 72, temperature 36.8 °C (98.2 °F), height 1.829 m (6' 0.01\"), weight 95.2 kg (209 lb 14.1 oz), SpO2 98 %.  Constitutional: Alert, no distress, plus 3 vital signs  Skin:  Warm, dry, no rashes invisible areas  Abdomen: Soft, TTP over lower abdomen, no hemorrhoid or skin tags visualized. no masses or hepatosplenomegaly, no rebound, no gaurding.   Psych: Alert, pleasant, well-groomed, normal affect    A/P:  1. BRBPR (bright red blood per rectum)  Advised patient to avoid constipation, take half to one capful of MiraLAX daily to soften the stool.  Possible patient is having internal hemorrhoids.ref to GI for further evaluation   Also checking CBC to evaluate for any possible anemias which is unlikely due to the amount of blood loss patient reports  - polyethylene glycol 3350 (MIRALAX) Powder; Take 17 g by mouth every day.  Dispense: 1 Bottle; Refill: 0  - REFERRAL TO GASTROENTEROLOGY  - CBC WITH DIFFERENTIAL; Future    2. Preventative health care    - CBC WITH DIFFERENTIAL; Future  - COMP METABOLIC PANEL; Future  - LIPID PROFILE; Future  - TSH WITH REFLEX TO FT4; Future      f/u prn    "

## 2018-12-12 ENCOUNTER — OFFICE VISIT (OUTPATIENT)
Dept: URGENT CARE | Facility: CLINIC | Age: 33
End: 2018-12-12
Payer: COMMERCIAL

## 2018-12-12 VITALS
TEMPERATURE: 98.5 F | RESPIRATION RATE: 16 BRPM | OXYGEN SATURATION: 96 % | HEART RATE: 78 BPM | SYSTOLIC BLOOD PRESSURE: 110 MMHG | DIASTOLIC BLOOD PRESSURE: 82 MMHG | HEIGHT: 71 IN | BODY MASS INDEX: 29.6 KG/M2 | WEIGHT: 211.4 LBS

## 2018-12-12 DIAGNOSIS — R05.9 COUGH: ICD-10-CM

## 2018-12-12 PROCEDURE — 99214 OFFICE O/P EST MOD 30 MIN: CPT | Performed by: INTERNAL MEDICINE

## 2018-12-12 RX ORDER — BENZONATATE 100 MG/1
100 CAPSULE ORAL 3 TIMES DAILY PRN
Qty: 15 CAP | Refills: 0 | Status: SHIPPED | OUTPATIENT
Start: 2018-12-12

## 2018-12-12 ASSESSMENT — ENCOUNTER SYMPTOMS
SPUTUM PRODUCTION: 1
NAUSEA: 0
CONSTIPATION: 0
EYE PAIN: 0
COUGH: 1
HEMOPTYSIS: 0
SENSORY CHANGE: 0
RHINORRHEA: 1
MYALGIAS: 0
DIARRHEA: 0
SHORTNESS OF BREATH: 0
FEVER: 0
EYE DISCHARGE: 0
SINUS PAIN: 0
HEADACHES: 1
ABDOMINAL PAIN: 0
HEARTBURN: 0
DIZZINESS: 0
SWEATS: 0
CHILLS: 0
SORE THROAT: 0
VOMITING: 0

## 2018-12-12 NOTE — PROGRESS NOTES
"Subjective:   Benny Rodriguez II is a 33 y.o. male who presents for Cough ([id number], cough, chest congestion, dark phlegm)        Cough   This is a new problem. The current episode started yesterday. The problem has been gradually worsening. The problem occurs every few minutes. The cough is productive of sputum. Associated symptoms include headaches and rhinorrhea. Pertinent negatives include no chest pain, chills, ear congestion, ear pain, fever, heartburn, hemoptysis, myalgias, nasal congestion, sore throat, shortness of breath or sweats. Nothing aggravates the symptoms. He has tried nothing for the symptoms. His past medical history is significant for bronchitis and pneumonia. There is no history of asthma.     Review of Systems   Constitutional: Negative for chills and fever.   HENT: Positive for congestion and rhinorrhea. Negative for ear discharge, ear pain, sinus pain and sore throat.    Eyes: Negative for pain and discharge.   Respiratory: Positive for cough and sputum production. Negative for hemoptysis and shortness of breath.    Cardiovascular: Negative for chest pain.   Gastrointestinal: Negative for abdominal pain, constipation, diarrhea, heartburn, nausea and vomiting.   Musculoskeletal: Negative for joint pain and myalgias.   Neurological: Positive for headaches. Negative for dizziness and sensory change.     Allergies   Allergen Reactions   • Bactrim Ds [Sulfamethoxazole-Trimethoprim] Hives   • Keflex Hives and Swelling     redness      Objective:   /82 (BP Location: Left arm, Patient Position: Sitting, BP Cuff Size: Adult)   Pulse 78   Temp 36.9 °C (98.5 °F) (Temporal)   Resp 16   Ht 1.803 m (5' 11\")   Wt 95.9 kg (211 lb 6.4 oz)   SpO2 96%   BMI 29.48 kg/m²   Physical Exam   Constitutional: He is oriented to person, place, and time. He appears well-developed and well-nourished. No distress.   HENT:   Head: Normocephalic and atraumatic.   Right Ear: Tympanic membrane, external " ear and ear canal normal.   Left Ear: Tympanic membrane, external ear and ear canal normal.   Nose: Rhinorrhea present. No mucosal edema or sinus tenderness.   Mouth/Throat: Uvula is midline, oropharynx is clear and moist and mucous membranes are normal.   Eyes: Pupils are equal, round, and reactive to light. Conjunctivae and EOM are normal.   Cardiovascular: Normal rate, regular rhythm and normal heart sounds.    Pulmonary/Chest: Effort normal and breath sounds normal. No respiratory distress. He has no wheezes. He has no rales.   Neurological: He is alert and oriented to person, place, and time.   Skin: Skin is warm and dry.   Psychiatric: He has a normal mood and affect. His behavior is normal. Judgment and thought content normal.         Assessment/Plan:   1. Cough  - benzonatate (TESSALON) 100 MG Cap; Take 1 Cap by mouth 3 times a day as needed for Cough.  Dispense: 15 Cap; Refill: 0  - Patient advised to take OTC Mucinex for symptoms  - Patient advised to return if he develops fever/chills, difficulty breathing    Differential diagnosis, natural history, supportive care, and indications for immediate follow-up discussed.   Case and results reviewed and agree with treatment plan as outlined.  Dr. Wilder

## 2019-04-16 ENCOUNTER — HOSPITAL ENCOUNTER (OUTPATIENT)
Facility: MEDICAL CENTER | Age: 34
End: 2019-04-16
Attending: PHYSICIAN ASSISTANT
Payer: COMMERCIAL

## 2019-04-16 ENCOUNTER — OFFICE VISIT (OUTPATIENT)
Dept: URGENT CARE | Facility: CLINIC | Age: 34
End: 2019-04-16
Payer: COMMERCIAL

## 2019-04-16 VITALS
HEART RATE: 88 BPM | SYSTOLIC BLOOD PRESSURE: 114 MMHG | RESPIRATION RATE: 16 BRPM | OXYGEN SATURATION: 96 % | HEIGHT: 71 IN | TEMPERATURE: 99.5 F | WEIGHT: 215.4 LBS | DIASTOLIC BLOOD PRESSURE: 70 MMHG | BODY MASS INDEX: 30.15 KG/M2

## 2019-04-16 DIAGNOSIS — J02.9 PHARYNGITIS, UNSPECIFIED ETIOLOGY: ICD-10-CM

## 2019-04-16 DIAGNOSIS — R52 BODY ACHES: ICD-10-CM

## 2019-04-16 LAB
INT CON NEG: NORMAL
INT CON POS: NORMAL
S PYO AG THROAT QL: NEGATIVE

## 2019-04-16 PROCEDURE — 87880 STREP A ASSAY W/OPTIC: CPT | Performed by: PHYSICIAN ASSISTANT

## 2019-04-16 PROCEDURE — 87077 CULTURE AEROBIC IDENTIFY: CPT

## 2019-04-16 PROCEDURE — 87070 CULTURE OTHR SPECIMN AEROBIC: CPT

## 2019-04-16 PROCEDURE — 99214 OFFICE O/P EST MOD 30 MIN: CPT | Performed by: PHYSICIAN ASSISTANT

## 2019-04-16 RX ORDER — AMOXICILLIN AND CLAVULANATE POTASSIUM 875; 125 MG/1; MG/1
1 TABLET, FILM COATED ORAL 2 TIMES DAILY
Qty: 14 TAB | Refills: 0 | Status: SHIPPED | OUTPATIENT
Start: 2019-04-16 | End: 2019-04-23

## 2019-04-16 ASSESSMENT — ENCOUNTER SYMPTOMS
DIZZINESS: 0
MYALGIAS: 1
FEVER: 1
STRIDOR: 0
VOMITING: 0
SORE THROAT: 1
DIARRHEA: 0
COUGH: 0
WHEEZING: 0
SWOLLEN GLANDS: 1
BODY ACHES: 1
SHORTNESS OF BREATH: 0
EYE REDNESS: 0
TROUBLE SWALLOWING: 1
CHILLS: 1
EYE DISCHARGE: 0
NECK PAIN: 0

## 2019-04-16 NOTE — PROGRESS NOTES
"Subjective:      Benny Rodriguez II is a 33 y.o. male who presents with Pharyngitis (x2 days) and Generalized Body Aches              Patient is a 33-year-old male who presents to urgent care with sore throat, body aches and subjective fevers for the last 2-3 days.      Pharyngitis     This is a new problem. The current episode started in the past 7 days. The pain is moderate. Associated symptoms include swollen glands and trouble swallowing. Pertinent negatives include no congestion, coughing, diarrhea, ear discharge, neck pain, shortness of breath, stridor or vomiting. He has had no exposure to strep or mono. He has tried cool liquids and acetaminophen for the symptoms. The treatment provided mild relief.    Generalized Body Aches    Associated symptoms include chills, a fever, myalgias, a sore throat and swollen glands. Pertinent negatives include no congestion, coughing, neck pain, rash or vomiting.   Of note patient reports prior rash with cephalexin however has been on amoxicillin without difficulty.    Review of Systems   Constitutional: Positive for chills, fever and malaise/fatigue.   HENT: Positive for sore throat and trouble swallowing. Negative for congestion and ear discharge.    Eyes: Negative for discharge and redness.   Respiratory: Negative for cough, shortness of breath, wheezing and stridor.    Gastrointestinal: Negative for diarrhea and vomiting.   Genitourinary: Negative for dysuria.   Musculoskeletal: Positive for myalgias. Negative for neck pain.   Skin: Negative for itching and rash.   Neurological: Negative for dizziness.   All other systems reviewed and are negative.         Objective:     /70 (BP Location: Left arm, Patient Position: Sitting, BP Cuff Size: Large adult)   Pulse 88   Temp 37.5 °C (99.5 °F)   Resp 16   Ht 1.803 m (5' 11\")   Wt 97.7 kg (215 lb 6.4 oz)   SpO2 96%   BMI 30.04 kg/m²     PMH:  has a past medical history of Gilbert's syndrome and Non-alcoholic " fatty liver disease.  MEDS:   Current Outpatient Prescriptions:   •  amoxicillin-clavulanate (AUGMENTIN) 875-125 MG Tab, Take 1 Tab by mouth 2 times a day for 7 days., Disp: 14 Tab, Rfl: 0  •  benzonatate (TESSALON) 100 MG Cap, Take 1 Cap by mouth 3 times a day as needed for Cough., Disp: 15 Cap, Rfl: 0  •  polyethylene glycol 3350 (MIRALAX) Powder, Take 17 g by mouth every day. (Patient not taking: Reported on 12/12/2018), Disp: 1 Bottle, Rfl: 0  ALLERGIES:   Allergies   Allergen Reactions   • Bactrim Ds [Sulfamethoxazole-Trimethoprim] Hives   • Keflex Hives and Swelling     redness     SURGHX:   Past Surgical History:   Procedure Laterality Date   • APPENDECTOMY     • VASECTOMY       SOCHX:  reports that he has never smoked. He has never used smokeless tobacco. He reports that he drinks about 1.2 oz of alcohol per week . He reports that he does not use drugs.  FH: Family history was reviewed, no pertinent findings to report    Physical Exam   Constitutional: He is oriented to person, place, and time. He appears well-developed and well-nourished.   HENT:   Head: Normocephalic and atraumatic.   Right Ear: External ear normal.   Left Ear: External ear normal.   Nose: Nose normal.   Mouth/Throat: No oropharyngeal exudate.   Posterior oropharynx with tonsillar edema without exudate.  Significant erythema diffusely.  Without evidence of abscess formation.    Eyes: Pupils are equal, round, and reactive to light. EOM are normal.   Neck: Normal range of motion. Neck supple. No thyromegaly present.   Cardiovascular: Normal rate and regular rhythm.    Pulmonary/Chest: Effort normal and breath sounds normal. No respiratory distress.   Musculoskeletal: Normal range of motion. He exhibits no edema.   Lymphadenopathy:     He has cervical adenopathy.   Neurological: He is alert and oriented to person, place, and time.   Skin: Skin is warm. No rash noted. No pallor.   Psychiatric: He has a normal mood and affect. His behavior is  normal.   Vitals reviewed.              Assessment/Plan:     1. Pharyngitis, unspecified etiology  - POCT Rapid Strep A  - amoxicillin-clavulanate (AUGMENTIN) 875-125 MG Tab; Take 1 Tab by mouth 2 times a day for 7 days.  Dispense: 14 Tab; Refill: 0  - CULTURE THROAT; Future    2. Body aches    Negative Group A negative.   We will send off for throat culture at this time testing for non-group A strep and other bacterial species.  Patient opted to begin antibiotics prior to throat culture results.  Patient given precautionary s/sx that mandate immediate follow up and evaluation in the ED. Advised of risks of not doing so.    DDX, Supportive care, and indications for immediate follow-up discussed with patient.    Instructed to return to clinic or nearest emergency department if we are not available for any change in condition, further concerns, or worsening of symptoms.    The patient demonstrated a good understanding and agreed with the treatment plan.  Please note that this dictation was created using voice recognition software. I have made every reasonable attempt to correct obvious errors, but I expect that there are errors of grammar and possibly content that I did not discover before finalizing the note.

## 2019-04-17 DIAGNOSIS — J02.9 PHARYNGITIS, UNSPECIFIED ETIOLOGY: ICD-10-CM

## 2019-04-19 ENCOUNTER — TELEPHONE (OUTPATIENT)
Dept: URGENT CARE | Facility: CLINIC | Age: 34
End: 2019-04-19

## 2019-04-19 LAB
BACTERIA SPEC RESP CULT: ABNORMAL
BACTERIA SPEC RESP CULT: ABNORMAL
SIGNIFICANT IND 70042: ABNORMAL
SITE SITE: ABNORMAL
SOURCE SOURCE: ABNORMAL

## 2019-04-20 DIAGNOSIS — J03.90 TONSILLITIS: ICD-10-CM

## 2019-04-20 NOTE — TELEPHONE ENCOUNTER
[facility] called office to notified positive lab results, patient tested positive for   Significant Indicator  (Positive)   POS (POS)    Source   THRT    Site   -    Upper Respiratory Culture, Res  (Abnormal)   Heavy growth usual upper respiratory avinash     Upper Respiratory Culture, Res  (Abnormal)      Moraxella catarrhalis (Beta-lactamase positive)   Moderate growth      Please advise.

## 2020-07-14 ENCOUNTER — TELEMEDICINE (OUTPATIENT)
Dept: TELEHEALTH | Facility: TELEMEDICINE | Age: 35
End: 2020-07-14

## 2020-07-14 DIAGNOSIS — M70.21 OLECRANON BURSITIS OF RIGHT ELBOW: ICD-10-CM

## 2020-07-14 PROCEDURE — 99213 OFFICE O/P EST LOW 20 MIN: CPT | Mod: 95,CR | Performed by: PHYSICIAN ASSISTANT

## 2020-07-14 RX ORDER — METHYLPREDNISOLONE 4 MG/1
TABLET ORAL
Qty: 21 TAB | Refills: 0 | Status: SHIPPED
Start: 2020-07-14 | End: 2020-07-21

## 2020-07-14 ASSESSMENT — ENCOUNTER SYMPTOMS
CHILLS: 0
NAUSEA: 0
TINGLING: 0
HEADACHES: 0
DIZZINESS: 0
VOMITING: 0
FEVER: 0
PALPITATIONS: 0

## 2020-07-14 NOTE — PROGRESS NOTES
Telemedicine Visit: Established Patient     This encounter was conducted via Zoom .   Verbal consent was obtained. Patient's identity was verified.    Subjective:   CC: Right elbow pain and swelling  Benny Rodriguez II is a 35 y.o. male presenting for evaluation and management of: Right elbow pain and swelling x3 days.  Patient states he recently started going back to the gym and lifting with his arms.  He noticed his right elbow getting tender and gradually starting to swell.  He states his pain is constant.  Any palpation over the elbow or movement of the elbow seems to aggravate the pain.  He states this feels similar to previous episodes of bursitis.  He denies having any trauma to the elbow.  He denies any scrapes cuts or bites to the area.  He denies being recently ill or any history of septic bursitis.  Currently he denies any fevers, chills, nausea or vomiting.  He has tried ibuprofen and topical NSAID gel for his pain with minimal relief.  He has also tried cold compresses with no improvement.      Review of Systems   Constitutional: Negative for chills, fever and malaise/fatigue.   Cardiovascular: Negative for chest pain and palpitations.   Gastrointestinal: Negative for nausea and vomiting.   Musculoskeletal:        Right elbow pain and inflammation.  Pain is aggravated with any movement or palpation.  Denies any trauma to the area.  Denies any history of septic bursitis.   Skin: Negative for itching and rash.   Neurological: Negative for dizziness, tingling and headaches.           Allergies   Allergen Reactions   • Bactrim Ds [Sulfamethoxazole-Trimethoprim] Hives   • Keflex Hives and Swelling     redness       Current medicines (including changes today)  Current Outpatient Medications   Medication Sig Dispense Refill   • benzonatate (TESSALON) 100 MG Cap Take 1 Cap by mouth 3 times a day as needed for Cough. 15 Cap 0   • polyethylene glycol 3350 (MIRALAX) Powder Take 17 g by mouth every day.  (Patient not taking: Reported on 12/12/2018) 1 Bottle 0     No current facility-administered medications for this visit.        Patient Active Problem List    Diagnosis Date Noted   • New onset headache 05/01/2018   • Molluscum contagiosum infection 09/12/2017   • Herpes genitalis in men 09/12/2017   • Non-alcoholic fatty liver disease 05/18/2017   • Gilbert syndrome 05/18/2017       Family History   Problem Relation Age of Onset   • Hypertension Father    • Stroke Father        He  has a past medical history of Gilbert's syndrome and Non-alcoholic fatty liver disease.  He  has a past surgical history that includes appendectomy and vasectomy.       Objective:   There were no vitals taken for this visit.    Physical Exam:  Constitutional: Alert, no distress, well-groomed.  Skin: No rashes in visible areas.  Eye: Round. Conjunctiva clear, lids normal. No icterus.   ENMT: Lips pink without lesions, good dentition, moist mucous membranes. Phonation normal.  Neck: No masses, no thyromegaly. Moves freely without pain.  Musculoskeletal: Right elbow: Elbow was viewed over video, visualized best as possible but clarity was an issue.  Seem to be swelling over the olecranon bursa.  Minimal erythema.  Denies any punctate wound or laceration.  Denies any drainage or purulence from the area.  Elbow range of motion full and intact.  Full hand and wrist range of motion.  Denies any paresthesias.  CV: Pulse as reported by patient  Respiratory: Unlabored respiratory effort, no cough or audible wheeze  Psych: Alert and oriented x3, normal affect and mood.       Assessment and Plan:   The following treatment plan was discussed:     1. Olecranon bursitis of right elbow    - methylPREDNISolone (MEDROL DOSEPAK) 4 MG Tablet Therapy Pack; Follow schedule on package instructions.  Dispense: 21 Tab; Refill: 0    RICE TREATMENT FOR EXTREMITY INJURIES:  R-rest the extremity as much as possible while pain and swelling persist  I-ice the  extremity 15 minutes every 2 hours for the first 24 hours, then 4-5 times daily   C-compress the extremity either with splint or ace wrap as directed  E-elevate the extremity to help with swelling    They continue to use ibuprofen and topical NSAID gel as needed for pain and inflammation.    List of red flag symptoms provided to the patient today.  He understands if any of these present return to the clinic or nearest emergency department.  Please call with any questions or concerns.      Follow-up: No follow-ups on file.

## 2020-07-14 NOTE — PATIENT INSTRUCTIONS
Elbow Bursitis    Bursitis is swelling and pain at the tip of the elbow. This happens when fluid builds up in a sac under the skin (bursa). This may also be called olecranon bursitis.  What are the causes?  Elbow bursitis may be caused by:  · Elbow injury, such as falling onto the elbow.  · Leaning on hard surfaces for long periods of time.  · Infection from an injury that breaks the skin near the elbow.  · A bone growth (spur) that forms at the tip of the elbow.  · A medical condition that causes inflammation, such as gout or rheumatoid arthritis.  Sometimes the cause is not known.  What are the signs or symptoms?  The first sign of elbow bursitis is usually swelling at the tip of the elbow. This can grow to be about the size of a golf ball. Swelling may start suddenly or develop gradually. Other symptoms may include:  · Pain when bending or leaning on the elbow.  · Not being able to move the elbow normally.  If bursitis is caused by an infection, you may have:  · Redness, warmth, and tenderness of the elbow.  · Drainage of pus from the swollen area over the elbow, if the skin breaks open.  How is this diagnosed?  This condition may be diagnosed based on:  · Your symptoms and medical history.  · Any recent injuries you have had.  · A physical exam.  · X-rays to check for a bone spur or fracture.  · Draining fluid from the bursa to test it for infection.  · Blood tests to rule out gout or rheumatoid arthritis.  How is this treated?  Treatment for elbow bursitis depends on the cause. Treatment may include:  · Medicines. These may include:  ? Over-the-counter medicines to relieve pain and inflammation.  ? Antibiotic medicines.  ? Injections of anti-inflammatory medicines (steroids).  · Draining fluid from the bursa.  · Wrapping your elbow with a bandage.  · Wearing elbow pads.  If these treatments do not help, you may need surgery to remove the bursa.  Follow these instructions at home:  Medicines  · Take  over-the-counter and prescription medicines only as told by your health care provider.  · If you were prescribed an antibiotic medicine, take it as told by your health care provider. Do not stop taking the antibiotic even if you start to feel better.  Managing pain, stiffness, and swelling    · If directed, put ice on your elbow:  ? Put ice in a plastic bag.  ? Place a towel between your skin and the bag.  ? Leave the ice on for 20 minutes, 2-3 times a day.  · If your bursitis is caused by an injury, rest your elbow and wear your bandage as told by your health care provider.  · Use elbow pads or elbow wraps to cushion your elbow as needed.  General instructions  · Avoid any activities that cause elbow pain. Ask your health care provider what activities are safe for you.  · Keep all follow-up visits as told by your health care provider. This is important.  Contact a health care provider if you have:  · A fever.  · Symptoms that do not get better with treatment.  · Pain or swelling that:  ? Gets worse.  ? Goes away and then comes back.  · Pus draining from your elbow.  Get help right away if you have:  · Trouble moving your arm, hand, or fingers.  Summary  · Elbow bursitis is inflammation of the fluid-filled sac (bursa) between the tip of your elbow bone (olecranon) and your skin.  · Treatment for elbow bursitis depends on the cause. It may include medicines to relieve pain and inflammation, antibiotic medicines, and draining fluid from your elbow.  · Contact a health care provider if your symptoms do not get better with treatment, or if your symptoms go away and then come back.  This information is not intended to replace advice given to you by your health care provider. Make sure you discuss any questions you have with your health care provider.  Document Released: 01/17/2008 Document Revised: 11/30/2018 Document Reviewed: 11/27/2018  Elsevier Patient Education © 2020 [employer]

## 2020-07-20 ENCOUNTER — PATIENT MESSAGE (OUTPATIENT)
Dept: URGENT CARE | Facility: CLINIC | Age: 35
End: 2020-07-20

## 2020-07-20 DIAGNOSIS — M70.20 OLECRANON BURSITIS, UNSPECIFIED LATERALITY: ICD-10-CM

## 2020-07-21 ENCOUNTER — OFFICE VISIT (OUTPATIENT)
Dept: URGENT CARE | Facility: CLINIC | Age: 35
End: 2020-07-21

## 2020-07-21 VITALS
BODY MASS INDEX: 28.59 KG/M2 | WEIGHT: 204.2 LBS | RESPIRATION RATE: 16 BRPM | HEART RATE: 55 BPM | DIASTOLIC BLOOD PRESSURE: 82 MMHG | TEMPERATURE: 97.8 F | HEIGHT: 71 IN | OXYGEN SATURATION: 100 % | SYSTOLIC BLOOD PRESSURE: 126 MMHG

## 2020-07-21 DIAGNOSIS — M70.21 OLECRANON BURSITIS OF RIGHT ELBOW: ICD-10-CM

## 2020-07-21 PROCEDURE — 99214 OFFICE O/P EST MOD 30 MIN: CPT | Performed by: NURSE PRACTITIONER

## 2020-07-21 RX ORDER — MELOXICAM 7.5 MG/1
15 TABLET ORAL DAILY
Qty: 60 TAB | Refills: 0 | Status: SHIPPED | OUTPATIENT
Start: 2020-07-21 | End: 2020-08-20

## 2020-07-21 RX ORDER — MELOXICAM 7.5 MG/1
15 TABLET ORAL DAILY
Qty: 60 TAB | Refills: 0 | Status: SHIPPED | OUTPATIENT
Start: 2020-07-21 | End: 2020-07-21

## 2020-07-21 RX ORDER — PREDNISONE 10 MG/1
TABLET ORAL
Qty: 21 QUANTITY SUFFICIENT | Refills: 0 | Status: SHIPPED | OUTPATIENT
Start: 2020-07-21 | End: 2020-08-04

## 2020-07-21 ASSESSMENT — ENCOUNTER SYMPTOMS
FEVER: 0
MUSCLE WEAKNESS: 0
WEAKNESS: 0
MYALGIAS: 0
FALLS: 0
NUMBNESS: 0
CHILLS: 0

## 2020-07-21 NOTE — LETTER
July 21, 2020    To Whom It May Concern:         This is confirmation that Benny Pfeiffergeraldo DE GUZMAN attended his scheduled appointment with ALVARO Escobar on 7/21/20.  Please allow him to work on a light duty basis, not lifting more than 50 pounds max for the next 7 days.         If you have any questions please do not hesitate to call me at the phone number listed below.    Sincerely,          OMA EscobarRRISHI.  [phone]

## 2020-07-21 NOTE — PROGRESS NOTES
Subjective:   Benny Rodriguez II is a 35 y.o. male who presents for Elbow Pain (x1 wk, teledoc jaskaran and was diagnosed with burstitis in the right elbow, finsihed all medication and pain came back )       Arm Pain    The incident occurred more than 1 week ago. Incident location: unknown, acute on chronic condition. The injury mechanism was repetitive motion. The pain is present in the right elbow. The quality of the pain is described as aching (swelling). The pain does not radiate. The pain is mild. The pain has been constant since the incident. Pertinent negatives include no muscle weakness or numbness. The symptoms are aggravated by movement, lifting and palpation. He has tried acetaminophen and NSAIDs (prior Medrol Dosepak) for the symptoms. The treatment provided mild relief.     Pt presents for evaluation of a new problem, reports pain returning onset of right elbow pain and swelling.  Has a history of having a right olecranon bursitis, which spontaneously resolved.  Called tele-doc over week ago, was given Medrol Dosepak.  He completed that medication regimen and noticed a significant decrease in his swelling and pain.  However, the following day he went to the gym as well as worked at [employer], lifting over 100 pounds noticed that he had increase of swelling and pain.  Denies that this current episode is work-related.    Review of Systems   Constitutional: Negative for chills, fever and malaise/fatigue.   Musculoskeletal: Positive for joint pain (Right elbow). Negative for falls and myalgias.   Neurological: Negative for weakness (r/t pain, right elbow) and numbness.   All other systems reviewed and are negative.      MEDS:   Current Outpatient Medications:   •  meloxicam (MOBIC) 7.5 MG Tab, Take 2 Tabs by mouth every day for 30 days., Disp: 60 Tab, Rfl: 0  •  predniSONE (DELTASONE) 10 MG Tab, Take 2 tablets once a day x7 days, then 1 tablet once a day x7 days, Disp: 21 Quantity Sufficient, Rfl: 0  •   "benzonatate (TESSALON) 100 MG Cap, Take 1 Cap by mouth 3 times a day as needed for Cough., Disp: 15 Cap, Rfl: 0  •  polyethylene glycol 3350 (MIRALAX) Powder, Take 17 g by mouth every day. (Patient not taking: Reported on 12/12/2018), Disp: 1 Bottle, Rfl: 0  ALLERGIES:   Allergies   Allergen Reactions   • Bactrim Ds [Sulfamethoxazole-Trimethoprim] Hives   • Keflex Hives and Swelling     redness       Patient's PMH, SocHx, SurgHx, FamHx, Drug allergies and medications were reviewed.     Objective:   /82   Pulse (!) 55   Temp 36.6 °C (97.8 °F) (Temporal)   Resp 16   Ht 1.803 m (5' 11\")   Wt 92.6 kg (204 lb 3.2 oz)   SpO2 100%   BMI 28.48 kg/m²     Physical Exam  Vitals signs reviewed.   Constitutional:       General: He is awake.      Appearance: Normal appearance. He is well-developed and normal weight.   HENT:      Head: Normocephalic and atraumatic.      Right Ear: External ear normal.      Left Ear: External ear normal.      Nose: Nose normal.      Mouth/Throat:      Mouth: Mucous membranes are moist.      Pharynx: Oropharynx is clear.   Eyes:      Extraocular Movements: Extraocular movements intact.      Conjunctiva/sclera: Conjunctivae normal.   Neck:      Musculoskeletal: Normal range of motion.   Cardiovascular:      Rate and Rhythm: Normal rate and regular rhythm.   Pulmonary:      Effort: Pulmonary effort is normal.   Musculoskeletal:      Right elbow: He exhibits swelling and effusion. Tenderness found. Olecranon process tenderness noted. Radial head: mild.   Skin:     General: Skin is warm and dry.   Neurological:      Mental Status: He is alert and oriented to person, place, and time.   Psychiatric:         Mood and Affect: Mood normal.         Behavior: Behavior normal.         Thought Content: Thought content normal.         Judgment: Judgment normal.          Assessment/Plan:   Assessment    1. Olecranon bursitis of right elbow  - meloxicam (MOBIC) 7.5 MG Tab; Take 2 Tabs by mouth every " day for 30 days.  Dispense: 60 Tab; Refill: 0  - predniSONE (DELTASONE) 10 MG Tab; Take 2 tablets once a day x7 days, then 1 tablet once a day x7 days  Dispense: 21 Quantity Sufficient; Refill: 0    Begin medications as listed.  Supportive care options also discussed.  Advised patient not to do upper body workout for approximately 3 to 4 weeks.  Note given for light duty restrictions x7 days.  Differential diagnosis, natural history, and indications for immediate follow-up were discussed.     Return to urgent care clinic or PCP if current symptoms do not improve and/or worsening symptoms occur. Advised of signs and symptoms which would warrant further evaluation and /or emergent evaluation in ER.  All questions answered and the patient agrees to the plan of care.

## 2022-07-20 NOTE — PROGRESS NOTES
Subjective:      Benny Rodriguez II is a 32 y.o. male who presents with Pharyngitis and Fever            Pharyngitis    This is a new problem. The current episode started yesterday. The problem has been unchanged. Neither side of throat is experiencing more pain than the other. There has been no fever. The pain is moderate. Associated symptoms include swollen glands and trouble swallowing. Pertinent negatives include no coughing. He has had no exposure to strep. He has tried nothing for the symptoms. The treatment provided no relief.   Fever    Associated symptoms include a sore throat. Pertinent negatives include no coughing.       Review of Systems   Constitutional: Positive for fever.   HENT: Positive for sore throat and trouble swallowing.    Eyes: Negative.    Respiratory: Negative.  Negative for cough.    Cardiovascular: Negative.    Skin: Negative.           Objective:     /70   Pulse 85   Temp 36.6 °C (97.9 °F)   Resp 16   Ht 1.829 m (6')   Wt 93.8 kg (206 lb 12.8 oz)   SpO2 98%   BMI 28.05 kg/m²      Physical Exam   Constitutional: He is oriented to person, place, and time. He appears well-developed and well-nourished. No distress.   HENT:   Head: Normocephalic and atraumatic.   +phar./tons redn     Eyes: EOM are normal. Pupils are equal, round, and reactive to light.   Neck: Normal range of motion. Neck supple.   Cardiovascular: Normal rate.    Pulmonary/Chest: Effort normal and breath sounds normal. No respiratory distress.   Genitourinary: Penile tenderness:     Lymphadenopathy:     He has cervical adenopathy.   Neurological: He is alert and oriented to person, place, and time.   Skin: Skin is warm and dry.   Psychiatric: He has a normal mood and affect. His behavior is normal.   Nursing note and vitals reviewed.    Active Ambulatory Problems     Diagnosis Date Noted   • Non-alcoholic fatty liver disease 05/18/2017   • Gilbert syndrome 05/18/2017   • Molluscum contagiosum infection  09/12/2017   • Herpes genitalis in men 09/12/2017     Resolved Ambulatory Problems     Diagnosis Date Noted   • No Resolved Ambulatory Problems     Past Medical History:   Diagnosis Date   • Gilbert's syndrome    • Non-alcoholic fatty liver disease      No current outpatient prescriptions on file prior to visit.     No current facility-administered medications on file prior to visit.      Social History     Social History   • Marital status:      Spouse name: N/A   • Number of children: N/A   • Years of education: N/A     Occupational History   • Not on file.     Social History Main Topics   • Smoking status: Never Smoker   • Smokeless tobacco: Never Used   • Alcohol use 1.2 oz/week     2 Cans of beer per week   • Drug use: No   • Sexual activity: Yes     Partners: Female     Birth control/ protection: Surgical     Other Topics Concern   • Not on file     Social History Narrative   • No narrative on file     Family History   Problem Relation Age of Onset   • Hypertension Father    • Stroke Father      Bactrim ds [sulfamethoxazole-trimethoprim] and Keflex              Assessment/Plan:     ·  ST      Gargles, Cepacol lozenges, Aleve/Advil as needed for throat pain; rx abx prn sx persists;   Return to clinic 3-5 days if symptoms persist or worsen or follow up with Primary Doctor       Abbe Flap (Upper To Lower Lip) Text: The defect of the lower lip was assessed and measured.  Given the location and size of the defect, an Abbe flap was deemed most appropriate.  Using a sterile surgical marker, an appropriate Abbe flap was measured and drawn on the upper lip. Local anesthesia was then infiltrated.  A scalpel was then used to incise the upper lip through and through the skin, vermilion, muscle and mucosa, leaving the flap pedicled on the opposite side.  The flap was then rotated and transferred to the lower lip defect.  The flap was then sutured into place with a three layer technique, closing the orbicularis oris muscle layer with subcutaneous buried sutures, followed by a mucosal layer and an epidermal layer.